# Patient Record
Sex: MALE | Race: WHITE | Employment: OTHER
[De-identification: names, ages, dates, MRNs, and addresses within clinical notes are randomized per-mention and may not be internally consistent; named-entity substitution may affect disease eponyms.]

---

## 2019-02-14 ENCOUNTER — TELEPHONE (OUTPATIENT)
Dept: CASE MANAGEMENT | Age: 64
End: 2019-02-14

## 2019-02-20 ENCOUNTER — TELEPHONE (OUTPATIENT)
Dept: ONCOLOGY | Age: 64
End: 2019-02-20

## 2019-02-21 ENCOUNTER — HOSPITAL ENCOUNTER (OUTPATIENT)
Age: 64
Discharge: HOME | End: 2019-02-21
Payer: MEDICARE

## 2019-02-21 ENCOUNTER — HOSPITAL ENCOUNTER (OUTPATIENT)
Dept: CT IMAGING | Age: 64
Discharge: HOME | End: 2019-02-23
Payer: MEDICARE

## 2019-02-21 DIAGNOSIS — F17.200 SMOKER: ICD-10-CM

## 2019-02-21 LAB
ALT SERPL-CCNC: 16 U/L (ref 5–41)
ANION GAP SERPL CALCULATED.3IONS-SCNC: 10 MMOL/L (ref 9–17)
BUN BLDV-MCNC: 13 MG/DL (ref 8–23)
BUN/CREAT BLD: NORMAL (ref 9–20)
CALCIUM SERPL-MCNC: 9.2 MG/DL (ref 8.6–10.4)
CHLORIDE BLD-SCNC: 105 MMOL/L (ref 98–107)
CHOLESTEROL/HDL RATIO: 3.4
CHOLESTEROL: 158 MG/DL
CO2: 23 MMOL/L (ref 20–31)
CREAT SERPL-MCNC: 0.92 MG/DL (ref 0.7–1.2)
GFR AFRICAN AMERICAN: >60 ML/MIN
GFR NON-AFRICAN AMERICAN: >60 ML/MIN
GFR SERPL CREATININE-BSD FRML MDRD: NORMAL ML/MIN/{1.73_M2}
GFR SERPL CREATININE-BSD FRML MDRD: NORMAL ML/MIN/{1.73_M2}
GLUCOSE BLD-MCNC: 98 MG/DL (ref 70–99)
HCT VFR BLD CALC: 46.5 % (ref 41–53)
HDLC SERPL-MCNC: 47 MG/DL
HEMOGLOBIN: 15.1 G/DL (ref 13.5–17.5)
LDL CHOLESTEROL: 85 MG/DL (ref 0–130)
MCH RBC QN AUTO: 27.5 PG (ref 26–34)
MCHC RBC AUTO-ENTMCNC: 32.4 G/DL (ref 31–37)
MCV RBC AUTO: 85.1 FL (ref 80–100)
NRBC AUTOMATED: NORMAL PER 100 WBC
PDW BLD-RTO: 13.4 % (ref 11.5–14.9)
PLATELET # BLD: 184 K/UL (ref 150–450)
PMV BLD AUTO: 8.7 FL (ref 6–12)
POTASSIUM SERPL-SCNC: 4.5 MMOL/L (ref 3.7–5.3)
PROSTATE SPECIFIC ANTIGEN: 1.77 UG/L
RBC # BLD: 5.47 M/UL (ref 4.5–5.9)
SODIUM BLD-SCNC: 138 MMOL/L (ref 135–144)
TRIGL SERPL-MCNC: 130 MG/DL
VLDLC SERPL CALC-MCNC: NORMAL MG/DL (ref 1–30)
WBC # BLD: 8.2 K/UL (ref 3.5–11)

## 2019-02-21 PROCEDURE — 36415 COLL VENOUS BLD VENIPUNCTURE: CPT

## 2019-02-21 PROCEDURE — 84460 ALANINE AMINO (ALT) (SGPT): CPT

## 2019-02-21 PROCEDURE — 80048 BASIC METABOLIC PNL TOTAL CA: CPT

## 2019-02-21 PROCEDURE — G0297 LDCT FOR LUNG CA SCREEN: HCPCS

## 2019-02-21 PROCEDURE — 85027 COMPLETE CBC AUTOMATED: CPT

## 2019-02-21 PROCEDURE — 80061 LIPID PANEL: CPT

## 2019-02-21 PROCEDURE — G0103 PSA SCREENING: HCPCS

## 2019-03-12 ENCOUNTER — TELEPHONE (OUTPATIENT)
Dept: ONCOLOGY | Age: 64
End: 2019-03-12

## 2019-03-12 VITALS — WEIGHT: 204 LBS | HEIGHT: 72 IN | BODY MASS INDEX: 27.63 KG/M2

## 2020-02-17 ENCOUNTER — TELEPHONE (OUTPATIENT)
Dept: ONCOLOGY | Age: 65
End: 2020-02-17

## 2020-02-17 NOTE — TELEPHONE ENCOUNTER
Outside order received for CT lung screening. EMR and order reviewed. Scheduling notified by faxed order, to contact patient and schedule. Information regarding ct lung screening and smoking cessation referral mailed to patient.

## 2020-02-24 ENCOUNTER — HOSPITAL ENCOUNTER (OUTPATIENT)
Dept: CT IMAGING | Age: 65
Discharge: HOME | End: 2020-02-26
Payer: MEDICARE

## 2020-02-24 PROCEDURE — G0297 LDCT FOR LUNG CA SCREEN: HCPCS

## 2021-02-03 ENCOUNTER — TELEPHONE (OUTPATIENT)
Dept: ONCOLOGY | Age: 66
End: 2021-02-03

## 2021-02-08 ENCOUNTER — TELEPHONE (OUTPATIENT)
Dept: ONCOLOGY | Age: 66
End: 2021-02-08

## 2021-02-08 NOTE — LETTER
2/8/2021        [address] [place] [address]    Dear Carrie Duenas:    Your healthcare provider has ordered a low dose CT scan of the chest for lung cancer screening. You will find enclosed, information about CT lung screening. Please review the statement of understanding, you will be asked to sign a copy of this at the time of your CT scan    If you have not already been contacted to make the appointment for your scan, please call our scheduling department at [phone]    Keep in mind that CT lung screening does not take the place of smoking cessation. If you are a current smoker, you will find enclosed smoking cessation resources. Please do not hesitate to contact me if you have any questions or concerns.     [address] [place] [address] Lung Screening Program  [phone]

## 2021-02-23 ENCOUNTER — HOSPITAL ENCOUNTER (OUTPATIENT)
Dept: CT IMAGING | Age: 66
Discharge: HOME | End: 2021-02-25
Payer: MEDICARE

## 2021-02-23 DIAGNOSIS — Z87.891 HISTORY OF TOBACCO USE: ICD-10-CM

## 2021-02-23 DIAGNOSIS — F17.211 CIGARETTE NICOTINE DEPENDENCE IN REMISSION: ICD-10-CM

## 2021-02-23 PROCEDURE — 71271 CT THORAX LUNG CANCER SCR C-: CPT

## 2021-03-04 ENCOUNTER — HOSPITAL ENCOUNTER (OUTPATIENT)
Dept: ULTRASOUND IMAGING | Age: 66
Discharge: HOME | End: 2021-03-06
Payer: MEDICARE

## 2021-03-04 DIAGNOSIS — N28.9 KIDNEY LESION: ICD-10-CM

## 2021-03-04 PROCEDURE — 76770 US EXAM ABDO BACK WALL COMP: CPT

## 2021-03-11 ENCOUNTER — HOSPITAL ENCOUNTER (OUTPATIENT)
Age: 66
Discharge: HOME | End: 2021-03-11
Payer: MEDICARE

## 2021-03-11 LAB
ALT SERPL-CCNC: 15 U/L (ref 5–41)
ANION GAP SERPL CALCULATED.3IONS-SCNC: 11 MMOL/L (ref 9–17)
AST SERPL-CCNC: 20 U/L
BUN BLDV-MCNC: 18 MG/DL (ref 8–23)
BUN/CREAT BLD: NORMAL (ref 9–20)
CALCIUM SERPL-MCNC: 9.6 MG/DL (ref 8.6–10.4)
CHLORIDE BLD-SCNC: 102 MMOL/L (ref 98–107)
CHOLESTEROL/HDL RATIO: 2.9
CHOLESTEROL: 112 MG/DL
CO2: 24 MMOL/L (ref 20–31)
CREAT SERPL-MCNC: 0.92 MG/DL (ref 0.7–1.2)
GFR AFRICAN AMERICAN: >60 ML/MIN
GFR NON-AFRICAN AMERICAN: >60 ML/MIN
GFR SERPL CREATININE-BSD FRML MDRD: NORMAL ML/MIN/{1.73_M2}
GFR SERPL CREATININE-BSD FRML MDRD: NORMAL ML/MIN/{1.73_M2}
GLUCOSE BLD-MCNC: 96 MG/DL (ref 70–99)
HDLC SERPL-MCNC: 38 MG/DL
LDL CHOLESTEROL: 64 MG/DL (ref 0–130)
POTASSIUM SERPL-SCNC: 4.8 MMOL/L (ref 3.7–5.3)
PROSTATE SPECIFIC ANTIGEN: 2.69 UG/L
SODIUM BLD-SCNC: 137 MMOL/L (ref 135–144)
TRIGL SERPL-MCNC: 52 MG/DL
VITAMIN D 25-HYDROXY: 19 NG/ML (ref 30–100)
VLDLC SERPL CALC-MCNC: ABNORMAL MG/DL (ref 1–30)

## 2021-03-11 PROCEDURE — 84450 TRANSFERASE (AST) (SGOT): CPT

## 2021-03-11 PROCEDURE — 82306 VITAMIN D 25 HYDROXY: CPT

## 2021-03-11 PROCEDURE — 80048 BASIC METABOLIC PNL TOTAL CA: CPT

## 2021-03-11 PROCEDURE — 80061 LIPID PANEL: CPT

## 2021-03-11 PROCEDURE — G0103 PSA SCREENING: HCPCS

## 2021-03-11 PROCEDURE — 36415 COLL VENOUS BLD VENIPUNCTURE: CPT

## 2021-03-11 PROCEDURE — 84460 ALANINE AMINO (ALT) (SGPT): CPT

## 2021-10-04 ENCOUNTER — HOSPITAL ENCOUNTER (OUTPATIENT)
Age: 66
Discharge: HOME | End: 2021-10-04
Payer: MEDICARE

## 2021-10-04 LAB — PROSTATE SPECIFIC ANTIGEN: 2.08 UG/L

## 2021-10-04 PROCEDURE — G0103 PSA SCREENING: HCPCS

## 2021-10-04 PROCEDURE — 36415 COLL VENOUS BLD VENIPUNCTURE: CPT

## 2022-03-01 ENCOUNTER — TELEPHONE (OUTPATIENT)
Dept: ONCOLOGY | Age: 67
End: 2022-03-01

## 2022-03-01 NOTE — TELEPHONE ENCOUNTER
Reviewed Auto-Printed CT Lung Screening Reminder Letter and patient's chart, patient is due and not yet scheduled. Mailed letter to patient.

## 2022-03-30 ENCOUNTER — HOSPITAL ENCOUNTER (OUTPATIENT)
Age: 67
Discharge: HOME | End: 2022-03-30
Payer: MEDICARE

## 2022-03-30 LAB
ALT SERPL-CCNC: 18 U/L (ref 5–41)
ANION GAP SERPL CALCULATED.3IONS-SCNC: 11 MMOL/L (ref 9–17)
BUN BLDV-MCNC: 10 MG/DL (ref 8–23)
CALCIUM SERPL-MCNC: 9.6 MG/DL (ref 8.6–10.4)
CHLORIDE BLD-SCNC: 106 MMOL/L (ref 98–107)
CHOLESTEROL/HDL RATIO: 3
CHOLESTEROL: 133 MG/DL
CO2: 22 MMOL/L (ref 20–31)
CREAT SERPL-MCNC: 0.83 MG/DL (ref 0.7–1.2)
GFR AFRICAN AMERICAN: >60 ML/MIN
GFR NON-AFRICAN AMERICAN: >60 ML/MIN
GFR SERPL CREATININE-BSD FRML MDRD: ABNORMAL ML/MIN/{1.73_M2}
GLUCOSE BLD-MCNC: 105 MG/DL (ref 70–99)
HDLC SERPL-MCNC: 44 MG/DL
LDL CHOLESTEROL: 74 MG/DL (ref 0–130)
POTASSIUM SERPL-SCNC: 4.5 MMOL/L (ref 3.7–5.3)
PROSTATE SPECIFIC ANTIGEN: 1.94 UG/L
SODIUM BLD-SCNC: 139 MMOL/L (ref 135–144)
TRIGL SERPL-MCNC: 73 MG/DL
VITAMIN D 25-HYDROXY: 27.2 NG/ML

## 2022-03-30 PROCEDURE — 84153 ASSAY OF PSA TOTAL: CPT

## 2022-03-30 PROCEDURE — 36415 COLL VENOUS BLD VENIPUNCTURE: CPT

## 2022-03-30 PROCEDURE — 80061 LIPID PANEL: CPT

## 2022-03-30 PROCEDURE — 84460 ALANINE AMINO (ALT) (SGPT): CPT

## 2022-03-30 PROCEDURE — 82306 VITAMIN D 25 HYDROXY: CPT

## 2022-03-30 PROCEDURE — 80048 BASIC METABOLIC PNL TOTAL CA: CPT

## 2022-04-04 ENCOUNTER — TELEPHONE (OUTPATIENT)
Dept: ONCOLOGY | Age: 67
End: 2022-04-04

## 2022-04-06 ENCOUNTER — HOSPITAL ENCOUNTER (OUTPATIENT)
Dept: CT IMAGING | Age: 67
Discharge: HOME | End: 2022-04-08
Payer: MEDICARE

## 2022-04-06 ENCOUNTER — HOSPITAL ENCOUNTER (OUTPATIENT)
Dept: ULTRASOUND IMAGING | Age: 67
Discharge: HOME | End: 2022-04-08
Payer: MEDICARE

## 2022-04-06 DIAGNOSIS — F17.211 CIGARETTE NICOTINE DEPENDENCE IN REMISSION: ICD-10-CM

## 2022-04-06 DIAGNOSIS — Z87.891 PERSONAL HISTORY OF TOBACCO USE: ICD-10-CM

## 2022-04-06 DIAGNOSIS — N28.1 RENAL CYST: ICD-10-CM

## 2022-04-06 PROCEDURE — 76770 US EXAM ABDO BACK WALL COMP: CPT

## 2022-04-06 PROCEDURE — 71271 CT THORAX LUNG CANCER SCR C-: CPT

## 2023-03-07 ENCOUNTER — TELEPHONE (OUTPATIENT)
Dept: ONCOLOGY | Age: 68
End: 2023-03-07

## 2023-04-23 ENCOUNTER — APPOINTMENT (OUTPATIENT)
Dept: GENERAL RADIOLOGY | Age: 68
DRG: 247 | End: 2023-04-23
Payer: MEDICARE

## 2023-04-23 ENCOUNTER — HOSPITAL ENCOUNTER (INPATIENT)
Age: 68
LOS: 2 days | Discharge: HOME | DRG: 247 | End: 2023-04-25
Attending: EMERGENCY MEDICINE
Payer: MEDICARE

## 2023-04-23 ENCOUNTER — HOSPITAL ENCOUNTER (EMERGENCY)
Age: 68
Discharge: ANOTHER HOSPITAL | DRG: 247 | End: 2023-04-23
Attending: EMERGENCY MEDICINE
Payer: MEDICARE

## 2023-04-23 VITALS
BODY MASS INDEX: 30.48 KG/M2 | HEIGHT: 72 IN | TEMPERATURE: 98.3 F | SYSTOLIC BLOOD PRESSURE: 101 MMHG | OXYGEN SATURATION: 99 % | HEART RATE: 73 BPM | DIASTOLIC BLOOD PRESSURE: 74 MMHG | RESPIRATION RATE: 18 BRPM | WEIGHT: 225 LBS

## 2023-04-23 DIAGNOSIS — I21.4 NSTEMI (NON-ST ELEVATED MYOCARDIAL INFARCTION) (HCC): Primary | ICD-10-CM

## 2023-04-23 PROBLEM — K21.9 CHRONIC GERD: Status: ACTIVE | Noted: 2023-04-23

## 2023-04-23 PROBLEM — E87.1 HYPONATREMIA: Status: ACTIVE | Noted: 2023-04-23

## 2023-04-23 PROBLEM — E78.5 DYSLIPIDEMIA: Status: ACTIVE | Noted: 2023-04-23

## 2023-04-23 LAB
ABSOLUTE EOS #: 0 K/UL (ref 0–0.4)
ABSOLUTE LYMPH #: 1.3 K/UL (ref 1–4.8)
ABSOLUTE MONO #: 1.5 K/UL (ref 0.1–1.3)
ALBUMIN SERPL-MCNC: 3.3 G/DL (ref 3.5–5.2)
ALBUMIN/GLOBULIN RATIO: 1 (ref 1–2.5)
ALP SERPL-CCNC: 64 U/L (ref 40–129)
ALT SERPL-CCNC: 36 U/L (ref 5–41)
ANION GAP SERPL CALCULATED.3IONS-SCNC: 12 MMOL/L (ref 9–17)
AST SERPL-CCNC: 95 U/L
BASOPHILS # BLD: 0 % (ref 0–2)
BASOPHILS ABSOLUTE: 0 K/UL (ref 0–0.2)
BILIRUB DIRECT SERPL-MCNC: 0.6 MG/DL
BILIRUB INDIRECT SERPL-MCNC: 0.8 MG/DL (ref 0–1)
BILIRUB SERPL-MCNC: 1.4 MG/DL (ref 0.3–1.2)
BUN SERPL-MCNC: 13 MG/DL (ref 8–23)
CALCIUM SERPL-MCNC: 9.3 MG/DL (ref 8.6–10.4)
CHLORIDE SERPL-SCNC: 98 MMOL/L (ref 98–107)
CO2 SERPL-SCNC: 22 MMOL/L (ref 20–31)
CREAT SERPL-MCNC: 0.92 MG/DL (ref 0.7–1.2)
EOSINOPHILS RELATIVE PERCENT: 0 % (ref 0–4)
GFR SERPL CREATININE-BSD FRML MDRD: >60 ML/MIN/1.73M2
GLUCOSE SERPL-MCNC: 108 MG/DL (ref 70–99)
HCT VFR BLD AUTO: 37.4 % (ref 40.7–50.3)
HCT VFR BLD AUTO: 39.3 % (ref 41–53)
HGB BLD-MCNC: 12.1 G/DL (ref 13–17)
HGB BLD-MCNC: 13.1 G/DL (ref 13.5–17.5)
LYMPHOCYTES # BLD: 12 % (ref 24–44)
MCH RBC QN AUTO: 27.9 PG (ref 26–34)
MCH RBC QN AUTO: 28.1 PG (ref 25.2–33.5)
MCHC RBC AUTO-ENTMCNC: 32.4 G/DL (ref 28.4–34.8)
MCHC RBC AUTO-ENTMCNC: 33.3 G/DL (ref 31–37)
MCV RBC AUTO: 83.9 FL (ref 80–100)
MCV RBC AUTO: 86.8 FL (ref 82.6–102.9)
MONOCYTES # BLD: 14 % (ref 1–7)
NRBC AUTOMATED: 0 PER 100 WBC
PARTIAL THROMBOPLASTIN TIME: 31.8 SEC (ref 24–36)
PARTIAL THROMBOPLASTIN TIME: 37.2 SEC (ref 23–36.5)
PARTIAL THROMBOPLASTIN TIME: 59 SEC (ref 23–36.5)
PDW BLD-RTO: 13.7 % (ref 11.8–14.4)
PDW BLD-RTO: 13.8 % (ref 11.5–14.9)
PLATELET # BLD AUTO: 172 K/UL (ref 150–450)
PLATELET # BLD AUTO: ABNORMAL K/UL (ref 138–453)
PLATELET, FLUORESCENCE: 144 K/UL (ref 138–453)
PLATELET, IMMATURE FRACTION: 4.6 % (ref 1.1–10.3)
PMV BLD AUTO: 8.4 FL (ref 6–12)
POTASSIUM SERPL-SCNC: 3.8 MMOL/L (ref 3.7–5.3)
PROT SERPL-MCNC: 6.7 G/DL (ref 6.4–8.3)
RBC # BLD: 4.31 M/UL (ref 4.21–5.77)
RBC # BLD: 4.68 M/UL (ref 4.5–5.9)
SEG NEUTROPHILS: 74 % (ref 36–66)
SEGMENTED NEUTROPHILS ABSOLUTE COUNT: 7.5 K/UL (ref 1.3–9.1)
SODIUM SERPL-SCNC: 132 MMOL/L (ref 135–144)
TROPONIN I SERPL DL<=0.01 NG/ML-MCNC: 2451 NG/L (ref 0–22)
TROPONIN I SERPL DL<=0.01 NG/ML-MCNC: 2488 NG/L (ref 0–22)
TROPONIN I SERPL DL<=0.01 NG/ML-MCNC: 2533 NG/L (ref 0–22)
TROPONIN I SERPL DL<=0.01 NG/ML-MCNC: 2544 NG/L (ref 0–22)
WBC # BLD AUTO: 10.3 K/UL (ref 3.5–11)
WBC # BLD AUTO: 11.4 K/UL (ref 3.5–11.3)

## 2023-04-23 PROCEDURE — 6370000000 HC RX 637 (ALT 250 FOR IP): Performed by: NURSE PRACTITIONER

## 2023-04-23 PROCEDURE — 71045 X-RAY EXAM CHEST 1 VIEW: CPT

## 2023-04-23 PROCEDURE — 83036 HEMOGLOBIN GLYCOSYLATED A1C: CPT

## 2023-04-23 PROCEDURE — 36415 COLL VENOUS BLD VENIPUNCTURE: CPT

## 2023-04-23 PROCEDURE — 80076 HEPATIC FUNCTION PANEL: CPT

## 2023-04-23 PROCEDURE — 96374 THER/PROPH/DIAG INJ IV PUSH: CPT

## 2023-04-23 PROCEDURE — 85027 COMPLETE CBC AUTOMATED: CPT

## 2023-04-23 PROCEDURE — 85730 THROMBOPLASTIN TIME PARTIAL: CPT

## 2023-04-23 PROCEDURE — 99223 1ST HOSP IP/OBS HIGH 75: CPT | Performed by: INTERNAL MEDICINE

## 2023-04-23 PROCEDURE — 6360000002 HC RX W HCPCS: Performed by: EMERGENCY MEDICINE

## 2023-04-23 PROCEDURE — 85025 COMPLETE CBC W/AUTO DIFF WBC: CPT

## 2023-04-23 PROCEDURE — 2500000003 HC RX 250 WO HCPCS: Performed by: STUDENT IN AN ORGANIZED HEALTH CARE EDUCATION/TRAINING PROGRAM

## 2023-04-23 PROCEDURE — 2580000003 HC RX 258: Performed by: NURSE PRACTITIONER

## 2023-04-23 PROCEDURE — 96375 TX/PRO/DX INJ NEW DRUG ADDON: CPT

## 2023-04-23 PROCEDURE — 6370000000 HC RX 637 (ALT 250 FOR IP): Performed by: EMERGENCY MEDICINE

## 2023-04-23 PROCEDURE — 84484 ASSAY OF TROPONIN QUANT: CPT

## 2023-04-23 PROCEDURE — 2060000000 HC ICU INTERMEDIATE R&B

## 2023-04-23 PROCEDURE — 93005 ELECTROCARDIOGRAM TRACING: CPT | Performed by: INTERNAL MEDICINE

## 2023-04-23 PROCEDURE — 93005 ELECTROCARDIOGRAM TRACING: CPT | Performed by: EMERGENCY MEDICINE

## 2023-04-23 PROCEDURE — 93005 ELECTROCARDIOGRAM TRACING: CPT | Performed by: STUDENT IN AN ORGANIZED HEALTH CARE EDUCATION/TRAINING PROGRAM

## 2023-04-23 PROCEDURE — 6360000002 HC RX W HCPCS: Performed by: INTERNAL MEDICINE

## 2023-04-23 PROCEDURE — 2580000003 HC RX 258: Performed by: STUDENT IN AN ORGANIZED HEALTH CARE EDUCATION/TRAINING PROGRAM

## 2023-04-23 PROCEDURE — 99285 EMERGENCY DEPT VISIT HI MDM: CPT

## 2023-04-23 PROCEDURE — 80048 BASIC METABOLIC PNL TOTAL CA: CPT

## 2023-04-23 PROCEDURE — 6360000002 HC RX W HCPCS: Performed by: STUDENT IN AN ORGANIZED HEALTH CARE EDUCATION/TRAINING PROGRAM

## 2023-04-23 PROCEDURE — 85055 RETICULATED PLATELET ASSAY: CPT

## 2023-04-23 RX ORDER — HEPARIN SODIUM 1000 [USP'U]/ML
60 INJECTION, SOLUTION INTRAVENOUS; SUBCUTANEOUS PRN
Status: DISCONTINUED | OUTPATIENT
Start: 2023-04-23 | End: 2023-04-23 | Stop reason: HOSPADM

## 2023-04-23 RX ORDER — METOPROLOL TARTRATE 50 MG/1
25 TABLET, FILM COATED ORAL 2 TIMES DAILY
Status: DISCONTINUED | OUTPATIENT
Start: 2023-04-23 | End: 2023-04-25 | Stop reason: HOSPADM

## 2023-04-23 RX ORDER — POTASSIUM CHLORIDE 20 MEQ/1
40 TABLET, EXTENDED RELEASE ORAL PRN
Status: DISCONTINUED | OUTPATIENT
Start: 2023-04-23 | End: 2023-04-25 | Stop reason: HOSPADM

## 2023-04-23 RX ORDER — MORPHINE SULFATE 4 MG/ML
4 INJECTION, SOLUTION INTRAMUSCULAR; INTRAVENOUS ONCE
Status: COMPLETED | OUTPATIENT
Start: 2023-04-23 | End: 2023-04-23

## 2023-04-23 RX ORDER — SODIUM CHLORIDE 9 MG/ML
INJECTION, SOLUTION INTRAVENOUS PRN
Status: DISCONTINUED | OUTPATIENT
Start: 2023-04-23 | End: 2023-04-25 | Stop reason: HOSPADM

## 2023-04-23 RX ORDER — OMEPRAZOLE 20 MG/1
20 CAPSULE, DELAYED RELEASE ORAL DAILY
COMMUNITY

## 2023-04-23 RX ORDER — HEPARIN SODIUM 1000 [USP'U]/ML
2000 INJECTION, SOLUTION INTRAVENOUS; SUBCUTANEOUS PRN
Status: DISCONTINUED | OUTPATIENT
Start: 2023-04-23 | End: 2023-04-25

## 2023-04-23 RX ORDER — ASPIRIN 81 MG/1
324 TABLET, CHEWABLE ORAL ONCE
Status: COMPLETED | OUTPATIENT
Start: 2023-04-23 | End: 2023-04-23

## 2023-04-23 RX ORDER — NITROGLYCERIN 20 MG/100ML
5-200 INJECTION INTRAVENOUS CONTINUOUS
Status: DISCONTINUED | OUTPATIENT
Start: 2023-04-23 | End: 2023-04-25

## 2023-04-23 RX ORDER — 0.9 % SODIUM CHLORIDE 0.9 %
1000 INTRAVENOUS SOLUTION INTRAVENOUS ONCE
Status: COMPLETED | OUTPATIENT
Start: 2023-04-23 | End: 2023-04-23

## 2023-04-23 RX ORDER — HEPARIN SODIUM 1000 [USP'U]/ML
30 INJECTION, SOLUTION INTRAVENOUS; SUBCUTANEOUS PRN
Status: DISCONTINUED | OUTPATIENT
Start: 2023-04-23 | End: 2023-04-23 | Stop reason: HOSPADM

## 2023-04-23 RX ORDER — MORPHINE SULFATE 2 MG/ML
2 INJECTION, SOLUTION INTRAMUSCULAR; INTRAVENOUS
Status: DISCONTINUED | OUTPATIENT
Start: 2023-04-23 | End: 2023-04-25 | Stop reason: HOSPADM

## 2023-04-23 RX ORDER — ASPIRIN 81 MG/1
81 TABLET, CHEWABLE ORAL DAILY
Status: DISCONTINUED | OUTPATIENT
Start: 2023-04-24 | End: 2023-04-25 | Stop reason: HOSPADM

## 2023-04-23 RX ORDER — ONDANSETRON 4 MG/1
4 TABLET, ORALLY DISINTEGRATING ORAL EVERY 8 HOURS PRN
Status: DISCONTINUED | OUTPATIENT
Start: 2023-04-23 | End: 2023-04-25 | Stop reason: HOSPADM

## 2023-04-23 RX ORDER — HEPARIN SODIUM 1000 [USP'U]/ML
60 INJECTION, SOLUTION INTRAVENOUS; SUBCUTANEOUS ONCE
Status: DISCONTINUED | OUTPATIENT
Start: 2023-04-23 | End: 2023-04-23 | Stop reason: SDUPTHER

## 2023-04-23 RX ORDER — HEPARIN SODIUM 10000 [USP'U]/100ML
5-30 INJECTION, SOLUTION INTRAVENOUS CONTINUOUS
Status: DISCONTINUED | OUTPATIENT
Start: 2023-04-23 | End: 2023-04-25

## 2023-04-23 RX ORDER — PANTOPRAZOLE SODIUM 40 MG/1
40 TABLET, DELAYED RELEASE ORAL
Status: DISCONTINUED | OUTPATIENT
Start: 2023-04-24 | End: 2023-04-25 | Stop reason: HOSPADM

## 2023-04-23 RX ORDER — OMEGA-3S/DHA/EPA/FISH OIL/D3 300MG-1000
400 CAPSULE ORAL DAILY
Status: DISCONTINUED | OUTPATIENT
Start: 2023-04-24 | End: 2023-04-25 | Stop reason: HOSPADM

## 2023-04-23 RX ORDER — POTASSIUM CHLORIDE 7.45 MG/ML
10 INJECTION INTRAVENOUS PRN
Status: DISCONTINUED | OUTPATIENT
Start: 2023-04-23 | End: 2023-04-25 | Stop reason: HOSPADM

## 2023-04-23 RX ORDER — HEPARIN SODIUM 1000 [USP'U]/ML
4000 INJECTION, SOLUTION INTRAVENOUS; SUBCUTANEOUS PRN
Status: DISCONTINUED | OUTPATIENT
Start: 2023-04-23 | End: 2023-04-25

## 2023-04-23 RX ORDER — NITROGLYCERIN 20 MG/100ML
5-200 INJECTION INTRAVENOUS CONTINUOUS
Status: DISCONTINUED | OUTPATIENT
Start: 2023-04-23 | End: 2023-04-23 | Stop reason: HOSPADM

## 2023-04-23 RX ORDER — MAGNESIUM SULFATE 1 G/100ML
1000 INJECTION INTRAVENOUS PRN
Status: DISCONTINUED | OUTPATIENT
Start: 2023-04-23 | End: 2023-04-25 | Stop reason: HOSPADM

## 2023-04-23 RX ORDER — NITROGLYCERIN 0.4 MG/1
0.4 TABLET SUBLINGUAL EVERY 5 MIN PRN
Status: DISCONTINUED | OUTPATIENT
Start: 2023-04-23 | End: 2023-04-25 | Stop reason: HOSPADM

## 2023-04-23 RX ORDER — PRAVASTATIN SODIUM 40 MG
40 TABLET ORAL 2 TIMES DAILY
COMMUNITY

## 2023-04-23 RX ORDER — HEPARIN SODIUM 10000 [USP'U]/100ML
5-30 INJECTION, SOLUTION INTRAVENOUS CONTINUOUS
Status: DISCONTINUED | OUTPATIENT
Start: 2023-04-23 | End: 2023-04-24 | Stop reason: SDUPTHER

## 2023-04-23 RX ORDER — PRAVASTATIN SODIUM 20 MG
40 TABLET ORAL 2 TIMES DAILY
Status: DISCONTINUED | OUTPATIENT
Start: 2023-04-23 | End: 2023-04-25 | Stop reason: HOSPADM

## 2023-04-23 RX ORDER — HEPARIN SODIUM 1000 [USP'U]/ML
60 INJECTION, SOLUTION INTRAVENOUS; SUBCUTANEOUS ONCE
Status: DISCONTINUED | OUTPATIENT
Start: 2023-04-23 | End: 2023-04-23

## 2023-04-23 RX ORDER — ACETAMINOPHEN 325 MG/1
650 TABLET ORAL EVERY 6 HOURS PRN
Status: DISCONTINUED | OUTPATIENT
Start: 2023-04-23 | End: 2023-04-25 | Stop reason: HOSPADM

## 2023-04-23 RX ORDER — HEPARIN SODIUM 1000 [USP'U]/ML
5000 INJECTION, SOLUTION INTRAVENOUS; SUBCUTANEOUS ONCE
Status: COMPLETED | OUTPATIENT
Start: 2023-04-23 | End: 2023-04-23

## 2023-04-23 RX ORDER — HEPARIN SODIUM 1000 [USP'U]/ML
5000 INJECTION, SOLUTION INTRAVENOUS; SUBCUTANEOUS PRN
Status: DISCONTINUED | OUTPATIENT
Start: 2023-04-23 | End: 2023-04-23

## 2023-04-23 RX ORDER — ATORVASTATIN CALCIUM 80 MG/1
80 TABLET, FILM COATED ORAL NIGHTLY
Status: DISCONTINUED | OUTPATIENT
Start: 2023-04-23 | End: 2023-04-23

## 2023-04-23 RX ORDER — OMEGA-3S/DHA/EPA/FISH OIL/D3 300MG-1000
400 CAPSULE ORAL DAILY
COMMUNITY

## 2023-04-23 RX ORDER — SODIUM CHLORIDE 0.9 % (FLUSH) 0.9 %
5-40 SYRINGE (ML) INJECTION EVERY 12 HOURS SCHEDULED
Status: DISCONTINUED | OUTPATIENT
Start: 2023-04-23 | End: 2023-04-25 | Stop reason: HOSPADM

## 2023-04-23 RX ORDER — ONDANSETRON 2 MG/ML
4 INJECTION INTRAMUSCULAR; INTRAVENOUS EVERY 6 HOURS PRN
Status: DISCONTINUED | OUTPATIENT
Start: 2023-04-23 | End: 2023-04-25 | Stop reason: HOSPADM

## 2023-04-23 RX ORDER — ACETAMINOPHEN 650 MG/1
650 SUPPOSITORY RECTAL EVERY 6 HOURS PRN
Status: DISCONTINUED | OUTPATIENT
Start: 2023-04-23 | End: 2023-04-25 | Stop reason: HOSPADM

## 2023-04-23 RX ORDER — MORPHINE SULFATE 4 MG/ML
4 INJECTION, SOLUTION INTRAMUSCULAR; INTRAVENOUS
Status: DISCONTINUED | OUTPATIENT
Start: 2023-04-23 | End: 2023-04-25 | Stop reason: HOSPADM

## 2023-04-23 RX ORDER — SODIUM CHLORIDE 0.9 % (FLUSH) 0.9 %
10 SYRINGE (ML) INJECTION PRN
Status: DISCONTINUED | OUTPATIENT
Start: 2023-04-23 | End: 2023-04-25 | Stop reason: HOSPADM

## 2023-04-23 RX ORDER — SODIUM CHLORIDE 9 MG/ML
INJECTION, SOLUTION INTRAVENOUS CONTINUOUS
Status: DISCONTINUED | OUTPATIENT
Start: 2023-04-23 | End: 2023-04-25

## 2023-04-23 RX ORDER — HEPARIN SODIUM 10000 [USP'U]/100ML
5-30 INJECTION, SOLUTION INTRAVENOUS CONTINUOUS
Status: DISCONTINUED | OUTPATIENT
Start: 2023-04-23 | End: 2023-04-23 | Stop reason: HOSPADM

## 2023-04-23 RX ORDER — HEPARIN SODIUM 1000 [USP'U]/ML
60 INJECTION, SOLUTION INTRAVENOUS; SUBCUTANEOUS PRN
Status: DISCONTINUED | OUTPATIENT
Start: 2023-04-23 | End: 2023-04-23

## 2023-04-23 RX ADMIN — HEPARIN SODIUM 9 UNITS/KG/HR: 10000 INJECTION, SOLUTION INTRAVENOUS at 17:08

## 2023-04-23 RX ADMIN — METOPROLOL TARTRATE 25 MG: 50 TABLET, FILM COATED ORAL at 22:24

## 2023-04-23 RX ADMIN — ASPIRIN 324 MG: 81 TABLET, CHEWABLE ORAL at 16:42

## 2023-04-23 RX ADMIN — SODIUM CHLORIDE 1000 ML: 9 INJECTION, SOLUTION INTRAVENOUS at 16:12

## 2023-04-23 RX ADMIN — NITROGLYCERIN 25 MCG/MIN: 20 INJECTION INTRAVENOUS at 19:26

## 2023-04-23 RX ADMIN — SODIUM CHLORIDE, PRESERVATIVE FREE 10 ML: 5 INJECTION INTRAVENOUS at 22:26

## 2023-04-23 RX ADMIN — HEPARIN SODIUM 9 UNITS/KG/HR: 10000 INJECTION, SOLUTION INTRAVENOUS at 19:24

## 2023-04-23 RX ADMIN — SODIUM CHLORIDE: 9 INJECTION, SOLUTION INTRAVENOUS at 22:21

## 2023-04-23 RX ADMIN — HEPARIN SODIUM 5000 UNITS: 1000 INJECTION INTRAVENOUS; SUBCUTANEOUS at 17:01

## 2023-04-23 RX ADMIN — MORPHINE SULFATE 2 MG: 2 INJECTION, SOLUTION INTRAMUSCULAR; INTRAVENOUS at 23:46

## 2023-04-23 RX ADMIN — MORPHINE SULFATE 4 MG: 4 INJECTION, SOLUTION INTRAMUSCULAR; INTRAVENOUS at 16:43

## 2023-04-23 RX ADMIN — SODIUM CHLORIDE 1000 ML: 9 INJECTION, SOLUTION INTRAVENOUS at 17:51

## 2023-04-23 RX ADMIN — NITROGLYCERIN 5 MCG/MIN: 20 INJECTION INTRAVENOUS at 17:53

## 2023-04-23 RX ADMIN — PRAVASTATIN SODIUM 40 MG: 20 TABLET ORAL at 22:26

## 2023-04-23 ASSESSMENT — ENCOUNTER SYMPTOMS
NAUSEA: 1
FACIAL SWELLING: 0
ABDOMINAL PAIN: 0
BACK PAIN: 0
SHORTNESS OF BREATH: 0
TROUBLE SWALLOWING: 0
CHEST TIGHTNESS: 1
VOMITING: 0
ABDOMINAL DISTENTION: 0

## 2023-04-23 ASSESSMENT — PAIN DESCRIPTION - PAIN TYPE
TYPE: ACUTE PAIN

## 2023-04-23 ASSESSMENT — PAIN DESCRIPTION - ORIENTATION
ORIENTATION: MID

## 2023-04-23 ASSESSMENT — PAIN DESCRIPTION - DESCRIPTORS
DESCRIPTORS: PRESSURE
DESCRIPTORS: PRESSURE
DESCRIPTORS: HEAVINESS;PRESSURE
DESCRIPTORS: PRESSURE
DESCRIPTORS: PRESSURE
DESCRIPTORS: PRESSURE;HEAVINESS

## 2023-04-23 ASSESSMENT — PAIN - FUNCTIONAL ASSESSMENT
PAIN_FUNCTIONAL_ASSESSMENT: ACTIVITIES ARE NOT PREVENTED
PAIN_FUNCTIONAL_ASSESSMENT: 0-10
PAIN_FUNCTIONAL_ASSESSMENT: ACTIVITIES ARE NOT PREVENTED
PAIN_FUNCTIONAL_ASSESSMENT: ACTIVITIES ARE NOT PREVENTED

## 2023-04-23 ASSESSMENT — PAIN SCALES - GENERAL
PAINLEVEL_OUTOF10: 2
PAINLEVEL_OUTOF10: 5
PAINLEVEL_OUTOF10: 2
PAINLEVEL_OUTOF10: 5
PAINLEVEL_OUTOF10: 3
PAINLEVEL_OUTOF10: 4
PAINLEVEL_OUTOF10: 2

## 2023-04-23 ASSESSMENT — PAIN DESCRIPTION - LOCATION
LOCATION: CHEST

## 2023-04-23 ASSESSMENT — PAIN DESCRIPTION - FREQUENCY
FREQUENCY: CONTINUOUS
FREQUENCY: CONTINUOUS

## 2023-04-24 ENCOUNTER — APPOINTMENT (OUTPATIENT)
Dept: CARDIAC CATH/INVASIVE PROCEDURES | Age: 68
DRG: 247 | End: 2023-04-24
Payer: MEDICARE

## 2023-04-24 PROBLEM — N30.00 ACUTE CYSTITIS: Status: ACTIVE | Noted: 2023-04-24

## 2023-04-24 LAB
ACTIVATED CLOTTING TIME: 403 SEC (ref 79–149)
BACTERIA: ABNORMAL
BILIRUBIN URINE: ABNORMAL
BNP SERPL-MCNC: 4413 PG/ML
CHOLEST SERPL-MCNC: 98 MG/DL
CHOLESTEROL/HDL RATIO: 2.1
COLOR: ABNORMAL
EKG ATRIAL RATE: 288 BPM
EKG ATRIAL RATE: 77 BPM
EKG ATRIAL RATE: 85 BPM
EKG P AXIS: 36 DEGREES
EKG P AXIS: 60 DEGREES
EKG P-R INTERVAL: 124 MS
EKG P-R INTERVAL: 160 MS
EKG Q-T INTERVAL: 374 MS
EKG Q-T INTERVAL: 384 MS
EKG Q-T INTERVAL: 394 MS
EKG QRS DURATION: 116 MS
EKG QRS DURATION: 88 MS
EKG QRS DURATION: 96 MS
EKG QTC CALCULATION (BAZETT): 415 MS
EKG QTC CALCULATION (BAZETT): 434 MS
EKG QTC CALCULATION (BAZETT): 468 MS
EKG R AXIS: -26 DEGREES
EKG R AXIS: -34 DEGREES
EKG R AXIS: -40 DEGREES
EKG T AXIS: -31 DEGREES
EKG T AXIS: -33 DEGREES
EKG T AXIS: -36 DEGREES
EKG VENTRICULAR RATE: 74 BPM
EKG VENTRICULAR RATE: 77 BPM
EKG VENTRICULAR RATE: 85 BPM
EPITHELIAL CELLS UA: ABNORMAL /HPF (ref 0–5)
GLUCOSE UR STRIP.AUTO-MCNC: NEGATIVE MG/DL
HCT VFR BLD AUTO: 36.9 % (ref 40.7–50.3)
HDLC SERPL-MCNC: 46 MG/DL
HGB BLD-MCNC: 11.3 G/DL (ref 13–17)
KETONES UR STRIP.AUTO-MCNC: NEGATIVE MG/DL
LDLC SERPL CALC-MCNC: 42 MG/DL (ref 0–130)
LEUKOCYTE ESTERASE UR QL STRIP.AUTO: ABNORMAL
MAGNESIUM SERPL-MCNC: 2 MG/DL (ref 1.6–2.6)
MCH RBC QN AUTO: 27.7 PG (ref 25.2–33.5)
MCHC RBC AUTO-ENTMCNC: 30.6 G/DL (ref 28.4–34.8)
MCV RBC AUTO: 90.4 FL (ref 82.6–102.9)
MUCUS: ABNORMAL
NITRITE UR QL STRIP.AUTO: POSITIVE
NRBC AUTOMATED: 0 PER 100 WBC
PARTIAL THROMBOPLASTIN TIME: 53.1 SEC (ref 23–36.5)
PDW BLD-RTO: 13.8 % (ref 11.8–14.4)
PLATELET # BLD AUTO: 136 K/UL (ref 138–453)
PMV BLD AUTO: 11.9 FL (ref 8.1–13.5)
PROT UR STRIP.AUTO-MCNC: 5.5 MG/DL (ref 5–8)
PROT UR STRIP.AUTO-MCNC: ABNORMAL MG/DL
RBC # BLD: 4.08 M/UL (ref 4.21–5.77)
RBC CLUMPS #/AREA URNS AUTO: ABNORMAL /HPF (ref 0–2)
SPECIFIC GRAVITY UA: 1.02 (ref 1–1.03)
TRIGL SERPL-MCNC: 51 MG/DL
TROPONIN I SERPL DL<=0.01 NG/ML-MCNC: 2626 NG/L (ref 0–22)
TSH SERPL-ACNC: 2.35 UIU/ML (ref 0.3–5)
TURBIDITY: ABNORMAL
URINE HGB: NEGATIVE
UROBILINOGEN, URINE: ABNORMAL
WBC # BLD AUTO: 9.6 K/UL (ref 3.5–11.3)
WBC UA: ABNORMAL /HPF (ref 0–5)

## 2023-04-24 PROCEDURE — C1887 CATHETER, GUIDING: HCPCS

## 2023-04-24 PROCEDURE — 2060000000 HC ICU INTERMEDIATE R&B

## 2023-04-24 PROCEDURE — 6360000002 HC RX W HCPCS: Performed by: STUDENT IN AN ORGANIZED HEALTH CARE EDUCATION/TRAINING PROGRAM

## 2023-04-24 PROCEDURE — 6360000004 HC RX CONTRAST MEDICATION

## 2023-04-24 PROCEDURE — 4A023N7 MEASUREMENT OF CARDIAC SAMPLING AND PRESSURE, LEFT HEART, PERCUTANEOUS APPROACH: ICD-10-PCS | Performed by: INTERNAL MEDICINE

## 2023-04-24 PROCEDURE — 027034Z DILATION OF CORONARY ARTERY, ONE ARTERY WITH DRUG-ELUTING INTRALUMINAL DEVICE, PERCUTANEOUS APPROACH: ICD-10-PCS | Performed by: INTERNAL MEDICINE

## 2023-04-24 PROCEDURE — C1725 CATH, TRANSLUMIN NON-LASER: HCPCS

## 2023-04-24 PROCEDURE — 85347 COAGULATION TIME ACTIVATED: CPT

## 2023-04-24 PROCEDURE — C1874 STENT, COATED/COV W/DEL SYS: HCPCS

## 2023-04-24 PROCEDURE — 2720000010 HC SURG SUPPLY STERILE

## 2023-04-24 PROCEDURE — 93010 ELECTROCARDIOGRAM REPORT: CPT | Performed by: INTERNAL MEDICINE

## 2023-04-24 PROCEDURE — 6360000002 HC RX W HCPCS: Performed by: NURSE PRACTITIONER

## 2023-04-24 PROCEDURE — 94761 N-INVAS EAR/PLS OXIMETRY MLT: CPT

## 2023-04-24 PROCEDURE — 2500000003 HC RX 250 WO HCPCS: Performed by: NURSE PRACTITIONER

## 2023-04-24 PROCEDURE — 99233 SBSQ HOSP IP/OBS HIGH 50: CPT | Performed by: FAMILY MEDICINE

## 2023-04-24 PROCEDURE — 2709999900 HC NON-CHARGEABLE SUPPLY

## 2023-04-24 PROCEDURE — 2500000003 HC RX 250 WO HCPCS

## 2023-04-24 PROCEDURE — C1769 GUIDE WIRE: HCPCS

## 2023-04-24 PROCEDURE — 85730 THROMBOPLASTIN TIME PARTIAL: CPT

## 2023-04-24 PROCEDURE — 80061 LIPID PANEL: CPT

## 2023-04-24 PROCEDURE — 83880 ASSAY OF NATRIURETIC PEPTIDE: CPT

## 2023-04-24 PROCEDURE — C1757 CATH, THROMBECTOMY/EMBOLECT: HCPCS

## 2023-04-24 PROCEDURE — 87086 URINE CULTURE/COLONY COUNT: CPT

## 2023-04-24 PROCEDURE — C9600 PERC DRUG-EL COR STENT SING: HCPCS

## 2023-04-24 PROCEDURE — 02C03ZZ EXTIRPATION OF MATTER FROM CORONARY ARTERY, ONE ARTERY, PERCUTANEOUS APPROACH: ICD-10-PCS | Performed by: INTERNAL MEDICINE

## 2023-04-24 PROCEDURE — B2111ZZ FLUOROSCOPY OF MULTIPLE CORONARY ARTERIES USING LOW OSMOLAR CONTRAST: ICD-10-PCS | Performed by: INTERNAL MEDICINE

## 2023-04-24 PROCEDURE — 6370000000 HC RX 637 (ALT 250 FOR IP)

## 2023-04-24 PROCEDURE — 6370000000 HC RX 637 (ALT 250 FOR IP): Performed by: NURSE PRACTITIONER

## 2023-04-24 PROCEDURE — 6370000000 HC RX 637 (ALT 250 FOR IP): Performed by: STUDENT IN AN ORGANIZED HEALTH CARE EDUCATION/TRAINING PROGRAM

## 2023-04-24 PROCEDURE — 81001 URINALYSIS AUTO W/SCOPE: CPT

## 2023-04-24 PROCEDURE — 83735 ASSAY OF MAGNESIUM: CPT

## 2023-04-24 PROCEDURE — 92973 PRQ TRLUML C MCHN ASP THRMBC: CPT

## 2023-04-24 PROCEDURE — C1894 INTRO/SHEATH, NON-LASER: HCPCS

## 2023-04-24 PROCEDURE — 84443 ASSAY THYROID STIM HORMONE: CPT

## 2023-04-24 PROCEDURE — 6370000000 HC RX 637 (ALT 250 FOR IP): Performed by: INTERNAL MEDICINE

## 2023-04-24 PROCEDURE — 6360000002 HC RX W HCPCS

## 2023-04-24 PROCEDURE — 36415 COLL VENOUS BLD VENIPUNCTURE: CPT

## 2023-04-24 PROCEDURE — 6370000000 HC RX 637 (ALT 250 FOR IP): Performed by: FAMILY MEDICINE

## 2023-04-24 PROCEDURE — 85027 COMPLETE CBC AUTOMATED: CPT

## 2023-04-24 PROCEDURE — 93458 L HRT ARTERY/VENTRICLE ANGIO: CPT

## 2023-04-24 PROCEDURE — 2580000003 HC RX 258: Performed by: NURSE PRACTITIONER

## 2023-04-24 RX ORDER — CIPROFLOXACIN 500 MG/1
500 TABLET, FILM COATED ORAL EVERY 12 HOURS SCHEDULED
Status: DISCONTINUED | OUTPATIENT
Start: 2023-04-24 | End: 2023-04-25 | Stop reason: HOSPADM

## 2023-04-24 RX ORDER — SODIUM CHLORIDE 9 MG/ML
INJECTION, SOLUTION INTRAVENOUS PRN
OUTPATIENT
Start: 2023-04-24

## 2023-04-24 RX ORDER — ACETAMINOPHEN 325 MG/1
650 TABLET ORAL EVERY 4 HOURS PRN
OUTPATIENT
Start: 2023-04-24

## 2023-04-24 RX ORDER — ATORVASTATIN CALCIUM 80 MG/1
80 TABLET, FILM COATED ORAL NIGHTLY
Status: DISCONTINUED | OUTPATIENT
Start: 2023-04-24 | End: 2023-04-25 | Stop reason: HOSPADM

## 2023-04-24 RX ORDER — SODIUM CHLORIDE 0.9 % (FLUSH) 0.9 %
5-40 SYRINGE (ML) INJECTION PRN
OUTPATIENT
Start: 2023-04-24

## 2023-04-24 RX ORDER — EPTIFIBATIDE 0.75 MG/ML
2 INJECTION, SOLUTION INTRAVENOUS CONTINUOUS
Status: DISPENSED | OUTPATIENT
Start: 2023-04-24 | End: 2023-04-25

## 2023-04-24 RX ORDER — SODIUM CHLORIDE 0.9 % (FLUSH) 0.9 %
5-40 SYRINGE (ML) INJECTION EVERY 12 HOURS SCHEDULED
OUTPATIENT
Start: 2023-04-24

## 2023-04-24 RX ADMIN — METOPROLOL TARTRATE 25 MG: 50 TABLET, FILM COATED ORAL at 07:56

## 2023-04-24 RX ADMIN — HEPARIN SODIUM 11 UNITS/KG/HR: 10000 INJECTION, SOLUTION INTRAVENOUS at 00:17

## 2023-04-24 RX ADMIN — PANTOPRAZOLE SODIUM 40 MG: 40 TABLET, DELAYED RELEASE ORAL at 07:56

## 2023-04-24 RX ADMIN — ASPIRIN 81 MG 81 MG: 81 TABLET ORAL at 07:56

## 2023-04-24 RX ADMIN — EPTIFIBATIDE 2 MCG/KG/MIN: 0.75 INJECTION INTRAVENOUS at 19:50

## 2023-04-24 RX ADMIN — METOPROLOL TARTRATE 25 MG: 50 TABLET, FILM COATED ORAL at 21:18

## 2023-04-24 RX ADMIN — TICAGRELOR 90 MG: 90 TABLET ORAL at 21:17

## 2023-04-24 RX ADMIN — SODIUM CHLORIDE: 9 INJECTION, SOLUTION INTRAVENOUS at 12:30

## 2023-04-24 RX ADMIN — EPTIFIBATIDE 2 MCG/KG/MIN: 0.75 INJECTION INTRAVENOUS at 14:56

## 2023-04-24 RX ADMIN — ATORVASTATIN CALCIUM 80 MG: 80 TABLET, FILM COATED ORAL at 21:17

## 2023-04-24 RX ADMIN — CIPROFLOXACIN 500 MG: 500 TABLET, FILM COATED ORAL at 21:17

## 2023-04-24 RX ADMIN — HEPARIN SODIUM 2000 UNITS: 1000 INJECTION INTRAVENOUS; SUBCUTANEOUS at 00:12

## 2023-04-24 RX ADMIN — SODIUM CHLORIDE, PRESERVATIVE FREE 10 ML: 5 INJECTION INTRAVENOUS at 21:19

## 2023-04-24 RX ADMIN — CHOLECALCIFEROL TAB 10 MCG (400 UNIT) 400 UNITS: 10 TAB at 07:56

## 2023-04-24 ASSESSMENT — ENCOUNTER SYMPTOMS
WHEEZING: 0
CONSTIPATION: 0
BLOOD IN STOOL: 0
SHORTNESS OF BREATH: 1
CHEST TIGHTNESS: 0
SHORTNESS OF BREATH: 0
COUGH: 0
ABDOMINAL PAIN: 0
RHINORRHEA: 0
NAUSEA: 0
NAUSEA: 1
VOMITING: 0
DIARRHEA: 0

## 2023-04-24 NOTE — PROGRESS NOTES
Patient admitted from ER to room 2009 with ongoing Nitroglycerin running at 25mcg/min and Heparin infusion running at 9units/kg/hr. Patient is having continuous mid sternal chest pain with pain score 2/10, pressure as describe by the patient. Telemetry applied and connected to monitor. Assessment completed and vitals signs obtained. Plan of care reviewed with patient. Will continue to monitor.

## 2023-04-24 NOTE — ED PROVIDER NOTES
[facility] ED  Emergency Department Encounter  Emergency Medicine Resident     Pt Name: Gee Bob  MRN: [id number]  [place] 1955  Date of evaluation: 4/24/23  PCP:  Danita Rand MD    CHIEF COMPLAINT       Chief Complaint   Patient presents with    Chest Pain       HISTORY OFPRESENT ILLNESS  (Location/Symptom, Timing/Onset, Context/Setting, Quality, Duration, Modifying Factors,Severity.)      Gee Bob is a 69 y.o. male with past medical history of hyperlipidemia who presents as a transfer from [facility] for NSTEMI. Patient reports that he has had 2 days of mild chest pressure, nausea and just generally not feeling well. Patient then started chopping down trees today. He then had significant worsening of his chest pain described as a pressure and an unrelenting weight on his chest.  Pain was worse with activity and taking a deep breath. Does report associated shortness of breath and nausea but denies diaphoresis, cough or vomiting. Denies previous cardiac history and does not take any cardiac medications. Denies recent stress test or echocardiogram.  He is a former smoker. Patient's work-up at [facility] showed a significantly elevated troponin of greater than 2500. He was given aspirin and started on heparin as well as nitroglycerin infusion. Patient also received morphine. He was discussed with cardiology and transferred here. PAST MEDICAL / SURGICAL / SOCIAL / FAMILY HISTORY     Past medical history of colon polyp, hyperlipidemia    Past surgical history colonoscopy    Social:  reports that he quit smoking about 4 years ago. His smoking use included cigarettes. He has a 40.00 pack-year smoking history. He has never used smokeless tobacco.    Family Hx: History reviewed. No pertinent family history.      Allergies:  Bee pollen and Pcn [penicillins]    Home Medications:  Prior to Admission medications    Medication Sig Start Date End Date

## 2023-04-24 NOTE — CONSULTS
bleeding, blood clots or swollen lymph nodes. Allergic/Immunologic: No nasal congestion or hives. PHYSICAL EXAM:      /72   Pulse 84   Temp 97.5 °F (36.4 °C) (Oral)   Resp 29   Ht 6' (1.829 m)   Wt 225 lb (102.1 kg)   SpO2 96%   BMI 30.52 kg/m²    Constitutional and General Appearance: alert, cooperative, no distress and appears stated age  HEENT: PERRL, no cervical lymphadenopathy. No masses palpable. Normal oral mucosa  Respiratory:  Normal excursion and expansion without use of accessory muscles  Resp Auscultation: Good respiratory effort. No for increased work of breathing. On auscultation: clear to auscultation bilaterally  Cardiovascular: The apical impulse is not displaced  Heart tones are crisp and normal. regular S1 and S2.  Jugular venous pulsation Normal  The carotid upstroke is normal in amplitude and contour without delay or bruit  Peripheral pulses are symmetrical and full   Abdomen:   No masses or tenderness  Bowel sounds present  Extremities:   No Cyanosis or Clubbing   Lower extremity edema: No   Skin: Warm and dry  Neurological:  Alert and oriented. Moves all extremities well  No abnormalities of mood, affect, memory, mentation, or behavior are noted    DATA:    Diagnostics:    EKG: NSR, T wave inversion in inferior leads  ECHO: ordered, but not yet obtained. Stress Test: not obtained.   Cardiac Angiography: 4/24/23      Diagnostic procedure: Lt Heart, Coronary Angio, LVgram      PCI procedure: PTCA / Drug Eluting Stent:, RCA and / or branches,   Thrombectomy:, RCA and / or branches     Complications:    - No complication     Indications:    - Non Q wave MI       - Coronary risk factors       - ECG changes      Conclusions      Procedure Summary      Single vessel acute occlusion / thrombotic proximal dominant RCA   Mild LV systolic dysfunction   Successful Thrombectomy RCA   Successful PTCA -ANATOLIY of the RCA   Integrilin was give IC and then IV      Recommendations      Post MI

## 2023-04-24 NOTE — OP NOTE
[facility]    CARDIAC CATHETERIZATION    Date:   4/24/2023  Patient name:  Bry Chavez  Date of admission:  4/23/2023  7:16 PM  MRN:   [id number]  YOB: 1955    Operators:  Primary:   Catrachito Bentley MD (Attending Physician)      Procedure performed:       [x] Left Heart Catheterization. [] Graft Angiography. [x] Left Ventriculography. [] Right Heart Catheterization. [x] Coronary Angiography. [] Aortic Valve Studies. [x] PCI:      [] Other:       Pre Procedure Conscious Sedation Data:  ASA Class:    [] I [] II [x] III [] IV    Mallampati Class:  [] I [] II [x] III [] IV      Indication:  [] STEMI      [] + Stress test  [x] ACS      [] + EKG Changes  [] Non Q MI       [] Significant Risk Factors  [] Recurrent Angina             [] Diabetes Mellitus    [] New LBBB      [] Uncontrolled HTN. [] CHF / Low EF changes     [] Abnormal CTA / Ca Score      Procedure:  Access:  [] Femoral  [x] Radial  artery       [x] Right  [] Left    Procedure: After informed consent was obtained with explanation of the risks and benefits, patient was brought to the cath lab. The access area was prepped and draped in sterile fashion. 1% lidocaine was used for local block. The artery was cannulated with 6  Fr sheath with brisk arterial blood return. The side port was frequently flushed and aspirated with normal saline.       Findings:      Estimated Blood Loss: Less than 20 mL    Conclusions:  Single vessel acute occlusion / thrombotic proximal dominant RCA   Mild LV systolic dysfunction   Successful Thrombectomy RCA   Successful PTCA -ANATOLIY of the RCA   Integrilin was give IC and then IV        Recommendations:  Post MI and stent protocol   Continue optimal medical therapy  Risk factor modification    ____________________________________________________________________    History and Risk Factors    [] Hypertension     [] Family history of CAD  [x] Hyperlipidemia     [] Cerebrovascular

## 2023-04-24 NOTE — CARE COORDINATION
Case Management Assessment  Initial Evaluation    Date/Time of Evaluation: 4/24/2023 11:12 AM  Assessment Completed by: Sia Moreno RN    If patient is discharged prior to next notation, then this note serves as note for discharge by case management. Patient Name: Tatiana Mccrary                   YOB: 1955  Diagnosis: NSTEMI (non-ST elevated myocardial infarction) [facility]) [I21.4]                   Date / Time: 4/23/2023  7:16 PM    Patient Admission Status: Inpatient   Readmission Risk (Low < 19, Mod (19-27), High > 27): Readmission Risk Score: 7.8    Current PCP: Jo-Ann Leblanc MD  PCP verified by CM? (P) Yes    Chart Reviewed: Yes      History Provided by: (P) Patient  Patient Orientation: (P) Alert and Oriented    Patient Cognition: (P) Alert    Hospitalization in the last 30 days (Readmission):  No    If yes, Readmission Assessment in CM Navigator will be completed. Advance Directives:      Code Status: Full Code   Patient's Primary Decision Maker is:        Discharge Planning:    Patient lives with: (P) Spouse/Significant Other, Parent Type of Home: (P) House  Primary Care Giver: (P) Self  Patient Support Systems include: (P) Spouse/Significant Other   Current Financial resources:    Current community resources:    Current services prior to admission: (P) None            Current DME:              Type of Home Care services:  (P) None    ADLS  Prior functional level: (P) Independent in ADLs/IADLs  Current functional level: (P) Independent in ADLs/IADLs    PT AM-PAC:   /24  OT AM-PAC:   /24    Family can provide assistance at DC: (P) Yes  Would you like Case Management to discuss the discharge plan with any other family members/significant others, and if so, who?     Plans to Return to Present Housing: (P) Yes  Other Identified Issues/Barriers to RETURNING to current housing: n/a  Potential Assistance needed at discharge: (P) N/A            Potential DME:    Patient expects to discharge to:

## 2023-04-24 NOTE — H&P
[facility]  Office: [address], DO, Vincent Ferguson, DO, Johanna Ortiz, DO, Nikkie Stevens, DO, Martin Santana MD, Genaro Looney MD, Princess Leeroy MD, Gisell Long MD,  Lesly Aguilar MD, Soni Bridges MD, Ladell Scheuermann, DO, Quincy Eubanks MD,  Brianna Torres MD, Godwin Solomon MD, Kp Cantu, DO, Kayla Villalta MD, Irma Thakkar MD, Zayanb Betancourt, DO, Rm Mast MD, Jannet Loredo MD, Katiuska Obrien MD, Len Turner MD,  Myron Vazquez, DO, Bryan Rivera MD,  Flory Gotti, CNP,  Maricruz Nino, CNP, Joel Mendes, CNP, Alla Tariq, CNP,  Waldo Ellsworth, [facility], Archana El, CNP, Dara Contreras, CNP, Alma Cyr, CNP, Arleen Alpers, CNP, Shannan Hinton, CNP, Jerene Oppenheim, PA-C, Gisela Govea, CNS, Pedro Luis Snow, CNP, Wali Ayon, CNP         [facility]    HISTORY AND PHYSICAL EXAMINATION            Date:   4/23/2023  Patient name:  Lilly Camacho  Date of admission:  4/23/2023  7:16 PM  MRN:   [id number]  Account:  [de-identified]  YOB: 1955  PCP:    Karen Reaves MD  Room:   2009/2009-01  Code Status:    Full Code    Chief Complaint:     Chief Complaint   Patient presents with    Chest Pain   \" I had chest pains\"    History Obtained From:     patient    History of Present Illness:     Lilly Camacho is a 69 y.o. male with chronic GERD, dyslipidemia who presents with Chest Pain   and is admitted to the hospital for the management of NSTEMI (non-ST elevated myocardial infarction) ([facility] [address] Patient describes gradual onset of chest pain after cutting wood on Friday. Initially thought it was musculoskeletal with midsternal pressure pain. States \"felt like something was sitting on my chest\".   Symptoms waxed and waned over the weekend with intermittent shortness of breath with difficulty breathing stating \"I just could not catch my breath\" with routine

## 2023-04-24 NOTE — PROGRESS NOTES
[facility]  Office: [address], DO, Malinda Michaelflores, DO, Chanda Crystals, DO, Raman Goldeusebiochristian Blood, DO, Flakito Smalls MD, Abhishek Ramirez MD, Vikki Wilson MD, Lisbeth Jacobo MD,  Celso Mae MD, Hailey Pahram MD, Radha Cash, DO, Nida Macias MD,  Nabila Suero MD, Fina Gonzalez MD, Philomena Bojorquez DO, Argenis Oneal MD, Fely Clark MD, Isrrael Huddleston DO, Ken Samuels MD, Lalito Arciniega MD, Hakan Langston MD, Chasidy Garcia MD,  Dewayne Brock DO, Florentin Sher MD,  Ramon Miller, [facility],  Aren Friedman, CNP, Kari Montalvo, CNP, Perley Gilford, CNP,  Asha Pinzon, [facility], Lynette Paris, CNP, Sara Muniz, CNP, Naima Sharp, CNP, Nerissa Coffey, CNP, Lawyer George, CNP, Aarti Worrell PA-C, Bright Schumacher, CNS, Cletis Hashimoto, CNP, Julieta Chris, [address] [place] [address]    Progress Note    4/24/2023    8:44 AM    Name:   Christian Sloan  MRN:     [id number]     [place]:      [de-identified]   Room:   2009/2009-01   Day:  1  Admit Date:  4/23/2023  7:16 PM    PCP:   Reyna Dick MD  Code Status:  Full Code    Subjective:     C/C:   Chief Complaint   Patient presents with    Chest Pain     Interval History Status: improved. Patient seen and examined at bedside, just came back from his cardiac cath, acute RCA lesion underwent thrombectomy and stenting with mild LV dysfunction and EF 45%. He is currently resting in bed comfortable stable  Blood pressure is on the lower side since  admitted  Patient currently denies any chest pain, shortness of breath, chills, fevers, nausea or vomiting. Patient vitals, labs and all providers notes were reviewed,from overnight shift and morning updates were noted and discussed with the nurse   no acute events overnight. Continue to improve overall with better breathing.   Brief History:     Christian Sloan is a 69 y.o. male with chronic GERD, dyslipidemia

## 2023-04-24 NOTE — ED PROVIDER NOTES
[facility]     Emergency Department     Faculty Attestation    I performed a history and physical examination of the patient and discussed management with the resident. I reviewed the residents note and agree with the documented findings including all diagnostic interpretations and plan of care. Any areas of disagreement are noted on the chart. I was personally present for the key portions of any procedures. I have documented in the chart those procedures where I was not present during the key portions. I have reviewed the emergency nurses triage note. I agree with the chief complaint, past medical history, past surgical history, allergies, medications, social and family history as documented unless otherwise noted below. Documentation of the HPI, Physical Exam and Medical Decision Making performed by danieliblukas is based on my personal performance of the HPI, PE and MDM. For Physician Assistant/ Nurse Practitioner cases/documentation I have personally evaluated this patient and have completed at least one if not all key elements of the E/M (history, physical exam, and MDM). Additional findings are as noted. Primary Care Physician: Marisela Goodman MD    Note Started: 11:40 PM EDT     VITAL SIGNS:   height is 6' (1.829 m) and weight is 225 lb (102.1 kg). His oral temperature is 98.6 °F (37 °C). His blood pressure is 99/61 and his pulse is 68. His respiration is 22 and oxygen saturation is 96%. Medical Decision Making  Chest pain. 2 days of symptoms. Transfer some [facility] due to elevated troponin of 2500. On heparin drip and nitro drip. Chest pain improved. On examination not acutely distressed, cardiac exam regular rate and rhythm does have midsystolic murmur, no prior history reported. Pulmonary clear bilaterally abdomen soft nontender nondistended. Impression is NSTEMI. Continue heparin and nitro drip. Admit to medicine.   Cardiology

## 2023-04-24 NOTE — PROGRESS NOTES
Still with continuous mid sternal chest pain with pain score 2/10. Provider aware. PRN morphine given.

## 2023-04-25 VITALS
TEMPERATURE: 98.1 F | SYSTOLIC BLOOD PRESSURE: 106 MMHG | HEIGHT: 72 IN | RESPIRATION RATE: 28 BRPM | OXYGEN SATURATION: 95 % | WEIGHT: 225 LBS | BODY MASS INDEX: 30.48 KG/M2 | HEART RATE: 63 BPM | DIASTOLIC BLOOD PRESSURE: 78 MMHG

## 2023-04-25 LAB
BNP SERPL-MCNC: 4768 PG/ML
MICROORGANISM SPEC CULT: NORMAL
SPECIMEN DESCRIPTION: NORMAL

## 2023-04-25 PROCEDURE — 6360000002 HC RX W HCPCS: Performed by: STUDENT IN AN ORGANIZED HEALTH CARE EDUCATION/TRAINING PROGRAM

## 2023-04-25 PROCEDURE — 6370000000 HC RX 637 (ALT 250 FOR IP): Performed by: STUDENT IN AN ORGANIZED HEALTH CARE EDUCATION/TRAINING PROGRAM

## 2023-04-25 PROCEDURE — 83880 ASSAY OF NATRIURETIC PEPTIDE: CPT

## 2023-04-25 PROCEDURE — 2580000003 HC RX 258: Performed by: NURSE PRACTITIONER

## 2023-04-25 PROCEDURE — 36415 COLL VENOUS BLD VENIPUNCTURE: CPT

## 2023-04-25 PROCEDURE — 6370000000 HC RX 637 (ALT 250 FOR IP): Performed by: FAMILY MEDICINE

## 2023-04-25 PROCEDURE — 6370000000 HC RX 637 (ALT 250 FOR IP): Performed by: NURSE PRACTITIONER

## 2023-04-25 PROCEDURE — 99239 HOSP IP/OBS DSCHRG MGMT >30: CPT | Performed by: FAMILY MEDICINE

## 2023-04-25 RX ORDER — FUROSEMIDE 20 MG/1
20 TABLET ORAL DAILY
Status: DISCONTINUED | OUTPATIENT
Start: 2023-04-25 | End: 2023-04-25 | Stop reason: HOSPADM

## 2023-04-25 RX ORDER — CIPROFLOXACIN 500 MG/1
500 TABLET, FILM COATED ORAL EVERY 12 HOURS SCHEDULED
Qty: 8 TABLET | Refills: 0 | Status: SHIPPED | OUTPATIENT
Start: 2023-04-25 | End: 2023-04-29

## 2023-04-25 RX ORDER — ERGOCALCIFEROL 1.25 MG/1
50000 CAPSULE ORAL WEEKLY
Qty: 12 CAPSULE | Refills: 1 | Status: SHIPPED | OUTPATIENT
Start: 2023-04-25

## 2023-04-25 RX ORDER — ASPIRIN 81 MG/1
81 TABLET, CHEWABLE ORAL DAILY
Qty: 30 TABLET | Refills: 3 | Status: SHIPPED | OUTPATIENT
Start: 2023-04-26

## 2023-04-25 RX ORDER — MIDODRINE HYDROCHLORIDE 5 MG/1
5 TABLET ORAL ONCE
Status: COMPLETED | OUTPATIENT
Start: 2023-04-25 | End: 2023-04-25

## 2023-04-25 RX ADMIN — EPTIFIBATIDE 2 MCG/KG/MIN: 0.75 INJECTION INTRAVENOUS at 01:54

## 2023-04-25 RX ADMIN — SODIUM CHLORIDE: 9 INJECTION, SOLUTION INTRAVENOUS at 01:55

## 2023-04-25 RX ADMIN — TICAGRELOR 90 MG: 90 TABLET ORAL at 08:05

## 2023-04-25 RX ADMIN — CIPROFLOXACIN 500 MG: 500 TABLET, FILM COATED ORAL at 08:06

## 2023-04-25 RX ADMIN — ASPIRIN 81 MG 81 MG: 81 TABLET ORAL at 08:06

## 2023-04-25 RX ADMIN — MIDODRINE HYDROCHLORIDE 5 MG: 5 TABLET ORAL at 12:01

## 2023-04-25 RX ADMIN — SODIUM CHLORIDE, PRESERVATIVE FREE 10 ML: 5 INJECTION INTRAVENOUS at 08:09

## 2023-04-25 RX ADMIN — FUROSEMIDE 20 MG: 20 TABLET ORAL at 12:36

## 2023-04-25 RX ADMIN — CHOLECALCIFEROL TAB 10 MCG (400 UNIT) 400 UNITS: 10 TAB at 08:05

## 2023-04-25 RX ADMIN — PANTOPRAZOLE SODIUM 40 MG: 40 TABLET, DELAYED RELEASE ORAL at 06:25

## 2023-04-25 NOTE — PLAN OF CARE
Problem: Discharge Planning  Goal: Discharge to home or other facility with appropriate resources  4/24/2023 2017 by Yesica Rousseau RN  Outcome: Progressing  4/24/2023 0909 by Joss Landa RN  Outcome: Progressing     Problem: Pain  Goal: Verbalizes/displays adequate comfort level or baseline comfort level  4/24/2023 2017 by Yesica Rousseau RN  Outcome: Progressing  4/24/2023 0909 by Joss Landa RN  Outcome: Progressing     Problem: ABCDS Injury Assessment  Goal: Absence of physical injury  4/24/2023 2017 by Yesica Rousseau RN  Outcome: Progressing  4/24/2023 0909 by Joss Landa RN  Outcome: Progressing     Problem: Safety - Adult  Goal: Free from fall injury  Outcome: Progressing

## 2023-04-25 NOTE — CARE COORDINATION
Spoke with patient regarding transitional plan of care. Pt to go home with wife. No other needs expressed at this time. 2nd IMM given.     Discharge [facility] Case Management Department  Written by: Aly Chauhan RN    Patient Name: Onelia Jasmine  Attending Provider: Smita Gonsales MD  Admit Date: 2023  7:16 PM  MRN: [id number]  Account: [de-identified]                     : 1955  Discharge Date: 23      Disposition: home    Aly Chauhan RN

## 2023-04-25 NOTE — DISCHARGE SUMMARY
[facility]  Office: [address], DO, Laureen Elder, DO, Radha Pedraza, DO, Alek London Blood, DO, Corry Rowland MD, Richie Kasper MD, José Hernandez MD, Vy Chao MD,  Gwendolyn Worthy MD, Elly Merchant MD, Maurizio Harrington, DO, Loly Jacob MD,  Melody Rouse MD, Disha Hairston MD, Nemo Lezama DO, Duglas Bowens MD, Sherrell Kitchen MD, Vero Hinkle, DO, Lane Hadley MD, Izaiah Caceres MD, Tere Ayala MD, Mirta Hairston MD,  Karrie Reagan, DO, Leah Burns MD,  Elvin Lacy, CNP,  Dara Karimi, CNP, Fidel Jo, CNP, Kayode Wolfe, CNP,  Jero Antony, [facility], Liza Hurtado, CNP, Vadim Oakley, CNP, Dustin Nobles, CNP, Oskar Maicas, CNP, Aristides Abdullahi, CNP, Yady Sam PA-C, Hannah Houston, CNS, Paul Beck, [facility], Timothy Ren, [facility]    Discharge Summary     Patient ID: Gavin Barney  :  1955   MRN: [id number]     ACCOUNT:  [de-identified]   Patient's PCP: Marisela Goodman MD  Admit Date: 2023   Discharge Date: 2023     Length of Stay: 2  Code Status:  Full Code  Admitting Physician: No admitting provider for patient encounter. Discharge Physician: Vy Chao MD     Active Discharge Diagnoses:     Hospital Problem Lists:  Principal Problem:    NSTEMI (non-ST elevated myocardial infarction) [facility])  Active Problems:    Chronic GERD    Dyslipidemia    Hyponatremia    Acute cystitis  Resolved Problems:    * No resolved hospital problems. *      Admission Condition:  poor     Discharged Condition: fair    Hospital Stay:     HPI:  The patient is a 69 y.o.  male who is admitted to the hospital for c/c of chest pain that started 2 days back while the patient was working in his garden. It was substernal, pressure like, non radiating, associated with shortness of breath and kept getting worse when he came to the ER for evaluation.  He

## 2023-04-25 NOTE — PROGRESS NOTES
[facility]  Progress Note                   Date:   4/25/2023  Patient name: Gualberto Mann  Date of admission:  4/23/2023  7:16 PM  MRN:   [id number]  YOB: 1955  PCP: Jillian Hassan MD    Reason for Admission: NSTEMI (non-ST elevated myocardial infarction) [facility]) [I21.4]    Subjective:       Clinical Changes /Abnormalities: Patient seen and examined in room with family. He denies chest pain and shortness of breath. Has complaints of dizziness when standing. Labs/vitals/tele reviewed. Discussed with RN. Review of Systems    Medications:   Scheduled Meds:   furosemide  20 mg Oral Daily    atorvastatin  80 mg Oral Nightly    ticagrelor  90 mg Oral BID    ciprofloxacin  500 mg Oral 2 times per day    pantoprazole  40 mg Oral QAM AC    [Held by provider] pravastatin  40 mg Oral BID    vitamin D3  400 Units Oral Daily    sodium chloride flush  5-40 mL IntraVENous 2 times per day    metoprolol tartrate  25 mg Oral BID    aspirin  81 mg Oral Daily     Continuous Infusions:   sodium chloride       CBC:   Recent Labs     04/23/23  1540 04/23/23 2129 04/24/23  0710   WBC 10.3 11.4* 9.6   HGB 13.1* 12.1* 11.3*    See Reflexed IPF Result 136*     BMP:    Recent Labs     04/23/23  1540   *   K 3.8   CL 98   CO2 22   BUN 13   CREATININE 0.92   GLUCOSE 108*     Hepatic:  Recent Labs     04/23/23 2129   AST 95*   ALT 36   BILITOT 1.4*   ALKPHOS 64     Troponin:   Recent Labs     04/23/23  1926 04/23/23 2129 04/23/23  2256   TROPHS 2,488* 2,533* 2,626*     BNP: No results for input(s): BNP in the last 72 hours. Lipids:   Recent Labs     04/24/23  0710   CHOL 98   HDL 46     INR: No results for input(s): INR in the last 72 hours. Cardiac cath 4/24/23  Findings:  LMCA: Normal 0% stenosis. LAD: Mild irregularities 20-30%. LCx: Mild irregularities 10-20%.      RCA: Large and dominant   100% stenosis proximal area   Minimal collateral   Filled with thrombus as

## 2023-04-25 NOTE — PROGRESS NOTES
Discharge instructions given to patient. Patient and wife displays understanding. IV's removed, safety maintained.

## 2023-04-26 LAB
EST. AVERAGE GLUCOSE BLD GHB EST-MCNC: 103 MG/DL
HBA1C MFR BLD: 5.2 % (ref 4–6)

## 2023-04-28 LAB
EKG ATRIAL RATE: 80 BPM
EKG P AXIS: 49 DEGREES
EKG P-R INTERVAL: 162 MS
EKG Q-T INTERVAL: 370 MS
EKG QRS DURATION: 96 MS
EKG QTC CALCULATION (BAZETT): 426 MS
EKG R AXIS: -29 DEGREES
EKG T AXIS: -18 DEGREES
EKG VENTRICULAR RATE: 80 BPM

## 2023-05-09 ENCOUNTER — HOSPITAL ENCOUNTER (OUTPATIENT)
Age: 68
Discharge: HOME | End: 2023-05-09
Payer: MEDICARE

## 2023-05-09 DIAGNOSIS — E78.2 MIXED HYPERLIPIDEMIA: ICD-10-CM

## 2023-05-09 LAB
ALT SERPL-CCNC: 13 U/L (ref 5–41)
AST SERPL-CCNC: 13 U/L
CHOLEST SERPL-MCNC: 112 MG/DL
CHOLESTEROL/HDL RATIO: 3.3
CK SERPL-CCNC: 44 U/L (ref 39–308)
HDLC SERPL-MCNC: 34 MG/DL
LDLC SERPL CALC-MCNC: 65 MG/DL (ref 0–130)
TRIGL SERPL-MCNC: 63 MG/DL

## 2023-05-09 PROCEDURE — 84450 TRANSFERASE (AST) (SGOT): CPT

## 2023-05-09 PROCEDURE — 80061 LIPID PANEL: CPT

## 2023-05-09 PROCEDURE — 36415 COLL VENOUS BLD VENIPUNCTURE: CPT

## 2023-05-09 PROCEDURE — 84460 ALANINE AMINO (ALT) (SGPT): CPT

## 2023-05-09 PROCEDURE — 82550 ASSAY OF CK (CPK): CPT

## 2023-06-19 ENCOUNTER — HOSPITAL ENCOUNTER (OUTPATIENT)
Dept: CARDIAC REHAB | Age: 68
Setting detail: THERAPIES SERIES
Discharge: HOME | End: 2023-06-19
Payer: MEDICARE

## 2023-06-19 VITALS — BODY MASS INDEX: 30.11 KG/M2 | WEIGHT: 222 LBS

## 2023-06-19 PROCEDURE — 93798 PHYS/QHP OP CAR RHAB W/ECG: CPT

## 2023-06-19 ASSESSMENT — EXERCISE STRESS TEST
PEAK_METS: 3.9
PEAK_HR: 88
PEAK_RPE: 11
PEAK_BP: 110/68

## 2023-06-21 ENCOUNTER — HOSPITAL ENCOUNTER (OUTPATIENT)
Dept: CARDIAC REHAB | Age: 68
Setting detail: THERAPIES SERIES
Discharge: HOME | End: 2023-06-21
Payer: MEDICARE

## 2023-06-21 VITALS — BODY MASS INDEX: 30.38 KG/M2 | WEIGHT: 224 LBS

## 2023-06-21 PROCEDURE — 93798 PHYS/QHP OP CAR RHAB W/ECG: CPT

## 2023-06-21 ASSESSMENT — EXERCISE STRESS TEST
PEAK_BP: 122/64
PEAK_METS: 3.8
PEAK_RPE: 12
PEAK_HR: 86

## 2023-06-23 ENCOUNTER — APPOINTMENT (OUTPATIENT)
Dept: CARDIAC REHAB | Age: 68
End: 2023-06-23
Payer: MEDICARE

## 2023-06-26 ENCOUNTER — APPOINTMENT (OUTPATIENT)
Dept: CARDIAC REHAB | Age: 68
End: 2023-06-26
Payer: MEDICARE

## 2023-06-28 ENCOUNTER — HOSPITAL ENCOUNTER (OUTPATIENT)
Dept: CARDIAC REHAB | Age: 68
Setting detail: THERAPIES SERIES
Discharge: HOME | End: 2023-06-28
Payer: MEDICARE

## 2023-06-28 ENCOUNTER — HOSPITAL ENCOUNTER (OUTPATIENT)
Dept: NON INVASIVE DIAGNOSTICS | Age: 68
Discharge: HOME | End: 2023-06-28
Payer: MEDICARE

## 2023-06-28 VITALS — WEIGHT: 224 LBS | BODY MASS INDEX: 30.38 KG/M2

## 2023-06-28 LAB
LV EF: 28 %
LVEF MODALITY: NORMAL

## 2023-06-28 PROCEDURE — 93798 PHYS/QHP OP CAR RHAB W/ECG: CPT

## 2023-06-28 PROCEDURE — 93306 TTE W/DOPPLER COMPLETE: CPT

## 2023-06-28 ASSESSMENT — EXERCISE STRESS TEST
PEAK_RPE: 11
PEAK_METS: 4.2
PEAK_BP: 128/80
PEAK_HR: 101

## 2023-06-30 ENCOUNTER — HOSPITAL ENCOUNTER (OUTPATIENT)
Dept: CARDIAC REHAB | Age: 68
Setting detail: THERAPIES SERIES
Discharge: HOME | End: 2023-06-30
Payer: MEDICARE

## 2023-06-30 VITALS — BODY MASS INDEX: 30.38 KG/M2 | WEIGHT: 224 LBS

## 2023-06-30 PROCEDURE — 93798 PHYS/QHP OP CAR RHAB W/ECG: CPT

## 2023-06-30 ASSESSMENT — EXERCISE STRESS TEST
PEAK_RPE: 12
PEAK_HR: 84
PEAK_BP: 122/70
PEAK_METS: 4.2

## 2023-07-03 ENCOUNTER — HOSPITAL ENCOUNTER (OUTPATIENT)
Dept: CARDIAC REHAB | Age: 68
Setting detail: THERAPIES SERIES
Discharge: HOME | End: 2023-07-03
Payer: MEDICARE

## 2023-07-03 ENCOUNTER — TELEPHONE (OUTPATIENT)
Dept: PHARMACY | Age: 68
End: 2023-07-03

## 2023-07-03 VITALS — WEIGHT: 221.9 LBS | BODY MASS INDEX: 30.1 KG/M2

## 2023-07-03 PROCEDURE — 93798 PHYS/QHP OP CAR RHAB W/ECG: CPT

## 2023-07-03 ASSESSMENT — EXERCISE STRESS TEST
PEAK_RPE: 12
PEAK_HR: 89
PEAK_METS: 5.2
PEAK_BP: 130/78

## 2023-07-03 NOTE — TELEPHONE ENCOUNTER
Filipe Simpson RN in [address] [place] [address] recommended that this patient would be a good candidate to have his heart failure medications managed by our service  Sent Referral to Jazmin Rodriguez for signature

## 2023-07-05 ENCOUNTER — HOSPITAL ENCOUNTER (OUTPATIENT)
Dept: CARDIAC REHAB | Age: 68
Setting detail: THERAPIES SERIES
Discharge: HOME | End: 2023-07-05
Payer: MEDICARE

## 2023-07-05 VITALS — BODY MASS INDEX: 30.11 KG/M2 | WEIGHT: 222 LBS

## 2023-07-05 PROCEDURE — 93798 PHYS/QHP OP CAR RHAB W/ECG: CPT

## 2023-07-05 ASSESSMENT — EXERCISE STRESS TEST
PEAK_RPE: 12.5
PEAK_HR: 97
PEAK_BP: 122/74
PEAK_METS: 5.8

## 2023-07-07 ENCOUNTER — HOSPITAL ENCOUNTER (OUTPATIENT)
Dept: CARDIAC REHAB | Age: 68
Setting detail: THERAPIES SERIES
Discharge: HOME | End: 2023-07-07
Payer: MEDICARE

## 2023-07-07 VITALS — BODY MASS INDEX: 30.08 KG/M2 | WEIGHT: 221.8 LBS

## 2023-07-07 PROCEDURE — 93798 PHYS/QHP OP CAR RHAB W/ECG: CPT

## 2023-07-07 ASSESSMENT — EXERCISE STRESS TEST
PEAK_METS: 5.2
PEAK_HR: 102
PEAK_RPE: 12
PEAK_BP: 112/70

## 2023-07-10 ENCOUNTER — TELEPHONE (OUTPATIENT)
Dept: PHARMACY | Age: 68
End: 2023-07-10

## 2023-07-10 ENCOUNTER — HOSPITAL ENCOUNTER (OUTPATIENT)
Dept: PHARMACY | Age: 68
Setting detail: THERAPIES SERIES
Discharge: HOME | End: 2023-07-10
Payer: MEDICARE

## 2023-07-10 ENCOUNTER — HOSPITAL ENCOUNTER (OUTPATIENT)
Dept: CARDIAC REHAB | Age: 68
Setting detail: THERAPIES SERIES
Discharge: HOME | End: 2023-07-10
Payer: MEDICARE

## 2023-07-10 VITALS
DIASTOLIC BLOOD PRESSURE: 72 MMHG | SYSTOLIC BLOOD PRESSURE: 125 MMHG | OXYGEN SATURATION: 97 % | BODY MASS INDEX: 29.91 KG/M2 | HEART RATE: 49 BPM | WEIGHT: 220.5 LBS

## 2023-07-10 VITALS — BODY MASS INDEX: 29.91 KG/M2 | WEIGHT: 220.5 LBS

## 2023-07-10 PROCEDURE — 99213 OFFICE O/P EST LOW 20 MIN: CPT

## 2023-07-10 PROCEDURE — 93798 PHYS/QHP OP CAR RHAB W/ECG: CPT

## 2023-07-10 RX ORDER — LISINOPRIL 2.5 MG/1
2.5 TABLET ORAL DAILY
Qty: 90 TABLET | Refills: 1 | Status: SHIPPED | OUTPATIENT
Start: 2023-07-10

## 2023-07-10 RX ORDER — METOPROLOL SUCCINATE 25 MG/1
25 TABLET, EXTENDED RELEASE ORAL DAILY
Qty: 30 TABLET | Refills: 3 | Status: SHIPPED | OUTPATIENT
Start: 2023-07-10

## 2023-07-10 RX ORDER — SACUBITRIL AND VALSARTAN 24; 26 MG/1; MG/1
1 TABLET, FILM COATED ORAL 2 TIMES DAILY
Qty: 60 TABLET | Refills: 3 | Status: SHIPPED
Start: 2023-07-10 | End: 2023-07-10 | Stop reason: CLARIF

## 2023-07-10 ASSESSMENT — EXERCISE STRESS TEST
PEAK_BP: 110/62
PEAK_RPE: 13
PEAK_BP: 110/62
PEAK_METS: 6.3
PEAK_HR: 93

## 2023-07-10 ASSESSMENT — EJECTION FRACTION: EF_VALUE: 45

## 2023-07-10 NOTE — PROGRESS NOTES
[facility]  Medication Management  Pharmacist  Heart Failure    [address], [address]  Phone: [phone]  Fax: [phone]      NAME: Kalyn Funk  MEDICAL RECORD NUMBER:  [id number]  AGE: 69 y.o. GENDER: male  : 1955  EPISODE DATE:  7/10/2023      Alla Gloria was referred to the [facility] Medication Management Service by Dr. Petrona Villanueva for heart failure services. Special Instructions per the Referral Include: Patient Education and Medication Management       ECHO EF%: 25-30% Date: 23        This visit was performed as:  THIS VISIT WAS PERFORMED AS: An in person visit. Protocols were followed with precautions to reduce the spread of COVID-19. Subjective   Alla Gloria is a 69 y.o. male here for the Heart Failure Services. They are here today for a comprehensive medication review including over the counter medications and herbal products, overall wellbeing assessment, transition of care, extensive education and any needed adjustments with updates and recommendations communicated to the referring physician. New [place] Heart Association Classification based on patient symptoms:  Class I: No limitation of physical activity. Ordinary physical activity does not cause undue fatigue, palpitation, dyspnea (shortness of breath). Shortness of Breath:   Denies shortness of breath    Other Heart Failure Findings:   Denies cough at night or when lying down  Denies lower extremity edema  Denies fluid weight gain  Denies unusual fatigue  Denies early saiety or abdominal fullness    General Findings:  No other general findings noted     Comments:    - States does not have any SOB. Helped son build a deck and never had any SOB. Discussed what heart failure and low EF means.      -Use salt substitute. Discussed maximum of 2000mg a day of sodium. Easts some canned veges, canned soups in winter. Drinks Water, tea, coffee - no powerade or gatorade.

## 2023-07-10 NOTE — TELEPHONE ENCOUNTER
Patient called to inform us he spoke to his insurance and he has to pay $550 deductible but then will be able to get Jardiance 10mg for $40 for a 90 day supply but Braulio Sarahy will be $144 for a 90 day supply. Would like to start on Jardiance 10mg at this time and writer agrees this would be a good idea. RX for Jardiance 10mg #90 take one tablet daily with 1 refill sent to [place] on [facility]. Patient indicates the Braulio Sarahy will be too expensive and would like to use the cheaper option we discussed. Patient started on lisinopril 2.5mg once a day. RX for lisinopril 2.5mg tablets #90 with 1 refill sent to [place] on [facility]. Lilia informed to cancel RX for  Braulio Sarahy. Patient asks about Spironolactone per our discussion earlier today. Discussed that I would like to sart lisinopril and monitor SrCr and potassium prior to starting spironolactone. Patient states understanding. Jordan Andersen RP,Pharm. D,, BCPS, CACP  7/10/2023  1:49 PM

## 2023-07-10 NOTE — FLOWSHEET NOTE
07/10/23 0813   Treatment Diagnosis   Treatment Diagnosis 1 NSTEMI   NSTEMI Date 04/23/23   Treatment Diagnosis 2 PCI   PCI Date 04/24/23   Referral Date 05/08/23   Co-morbidities Pulmonary disease  (HLD)   Oxygen Saturation / Titration    Stages of Change  Maintenance   Oxygen Intervention   Oxygen Use No   O2 Sat Greater Than 90% Yes   Nurse/Patient Discussion  YES   Individual Treatment Plan   ITP Visit Type Re-assessment   1st Date of Exercise  06/12/23   ITP Next Review Date 08/07/23   Visit #/Total Visits 12/36   EF% 45 %   Risk Stratification Moderate   ITP Exercise;Psychosocial;Tobacco;Nutrition;Education   Exercise    Stages of Change Action   Assisted Devices None   Exercise Prescription   Mode Treadmill;Bike;Stepper;Ergometer   Frequency per week 3   Duration Per Session 40 MIN   Intensity METS       6.3   RPE 13   Progression PROGRESSING   Resistance Training Yes   Exercise Blood Pressures   Resting /80   Peak /62   Is BP WDL Yes   Exercise Activity at Home   Type NONE CURRENTLY  (PT IS DOING REGULAR YARD WORK. ENCOUTAGED HOME EXERCISE ON OFF DAYS)   Resistance Training No   Exercise Education   Education Self pulse;Exercise safety;Signs/symptoms to report;RPE scale;Equipment orientation; Warm up/cool down;Physically active   Exercise Target Goal   Target Goal(s) Individual exercise RX;BP < 140/90 or < 130/80, if DM or CKD; Aerobic activity 30 + minutes/day  5 days/week   Patient Stated Exercise Goals TO LOSE WEIGHT   Psychosocial   Stages of Change Maintenance   Psychosocial Intervention   Interventions No intervention indicated   Currently Taking Psychotropic Meds No   Medication Changes No   Psychosocial Education   Education Benefits of CPR completion;Cardiac meds; Coping techniques; Environmental triggers; Impact self care behaviors on health;Relaxation techniques; Sexual activity; Signs/symptoms of depression   Psychosocial Target Goals   Target Goal(s) Assess presence or absence of

## 2023-07-10 NOTE — DISCHARGE INSTRUCTIONS
Switch from metoprolol tartrate to metoprolol succinate. Start:  metoprolol succinate 25mg once a day  Entresto 24-26 once a day    Weigh yourself each day and keep track of weight. Call our office with an increase of 3 or more lbs in one day or 5 or more lbs in one week. Bring completed patient assistance forms for Jardiance back to next appt.

## 2023-07-10 NOTE — TELEPHONE ENCOUNTER
Discontinue metoprolol tartrate and switch to metoprolol succinate. RX for metoprolol succinate 25mg once a day #30 with 3 refills sent to [facility] on [address]. Entresto 24-26 #60 with 3 refills sent to [facility] on [address]. Jordan Andersen RPH,Pharm. D,, [facility], CACP  7/10/2023  8:32 AM

## 2023-07-12 ENCOUNTER — HOSPITAL ENCOUNTER (OUTPATIENT)
Dept: CARDIAC REHAB | Age: 68
Setting detail: THERAPIES SERIES
Discharge: HOME | End: 2023-07-12
Payer: MEDICARE

## 2023-07-12 VITALS — BODY MASS INDEX: 29.91 KG/M2 | WEIGHT: 220.5 LBS

## 2023-07-12 PROCEDURE — 93798 PHYS/QHP OP CAR RHAB W/ECG: CPT

## 2023-07-12 ASSESSMENT — EXERCISE STRESS TEST
PEAK_RPE: 12
PEAK_HR: 93
PEAK_METS: 6.1
PEAK_BP: 132/86

## 2023-07-13 ENCOUNTER — TELEPHONE (OUTPATIENT)
Dept: PHARMACY | Age: 68
End: 2023-07-13

## 2023-07-13 NOTE — TELEPHONE ENCOUNTER
Spoke with Gray Weinstein at [facility]. Fax received.  Will get provider signature and fax back to us later today    Dimitri Espinoza, Pharm D, [address] [facility]  7/13/2023 3:28 PM

## 2023-07-13 NOTE — TELEPHONE ENCOUNTER
Received signed provider form for Jardiance patient assistance. Added to patient's filled out forms and faxed entire application to [facility] Patient Assistance.     Anyi Buck, Pharm D, BCPS  [facility]  7/13/2023 3:54 PM

## 2023-07-13 NOTE — TELEPHONE ENCOUNTER
Patient called to inquiry of status of Jardiance patient assistance program. Patient states he was able to pickup prescription for lisinopril and will start that tomorrow. Pt also picked up and started Toprol XL prescription. He was not able to  prescription for the Jardiance. States prescription was going to be $550. He has a deductible that he needs to meet prior to the cost being reduced to $40 for 90ds. Patient had dropped off his portion of the patient assistance forms yesterday. Clinic is waiting on provider signature. Form was faxed to [address] [place] [address], Dr. Akua Mills. Awaiting signature. Informed patient that it may take several weeks for approval once the forms are faxed in. Informed patient I would contact the physician's office to ensure fax was received and possibly expedite getting the signature. Patient appreciative. At this point, patient will start the new lisinopril and Toprol and will NOT be starting the Jardiance due to cost.     [facility], Left voicemail on the nursing line. Requesting call back to confirm fax for patient assistance was received and will be signed by Dr. Michael Montalvo.      Princess Castillo, Pharm D, [facility]  7/13/2023 2:58 PM

## 2023-07-14 ENCOUNTER — TELEPHONE (OUTPATIENT)
Dept: PHARMACY | Age: 68
End: 2023-07-14

## 2023-07-14 ENCOUNTER — APPOINTMENT (OUTPATIENT)
Dept: CARDIAC REHAB | Age: 68
End: 2023-07-14
Payer: MEDICARE

## 2023-07-14 NOTE — TELEPHONE ENCOUNTER
Pt called saying [employer] called and was missing page 4 from application. Refaxed complete application to [employer].     Joe Stout RPH,PharmD, BCACP  7/14/2023  2:40 PM

## 2023-07-17 ENCOUNTER — HOSPITAL ENCOUNTER (OUTPATIENT)
Dept: CARDIAC REHAB | Age: 68
Setting detail: THERAPIES SERIES
Discharge: HOME | End: 2023-07-17
Payer: MEDICARE

## 2023-07-17 VITALS — WEIGHT: 224 LBS | BODY MASS INDEX: 30.38 KG/M2

## 2023-07-17 PROCEDURE — 93798 PHYS/QHP OP CAR RHAB W/ECG: CPT

## 2023-07-17 ASSESSMENT — EXERCISE STRESS TEST
PEAK_RPE: 13
PEAK_BP: 122/76
PEAK_HR: 93
PEAK_METS: 7

## 2023-07-18 ENCOUNTER — HOSPITAL ENCOUNTER (OUTPATIENT)
Dept: OTHER | Age: 68
Discharge: HOME | End: 2023-07-18
Payer: MEDICARE

## 2023-07-18 ENCOUNTER — HOSPITAL ENCOUNTER (OUTPATIENT)
Age: 68
Discharge: HOME | End: 2023-07-18
Payer: MEDICARE

## 2023-07-18 VITALS
HEIGHT: 72 IN | BODY MASS INDEX: 30.64 KG/M2 | RESPIRATION RATE: 18 BRPM | WEIGHT: 226.2 LBS | SYSTOLIC BLOOD PRESSURE: 118 MMHG | HEART RATE: 52 BPM | OXYGEN SATURATION: 98 % | DIASTOLIC BLOOD PRESSURE: 78 MMHG

## 2023-07-18 LAB
ANION GAP SERPL CALCULATED.3IONS-SCNC: 11 MMOL/L (ref 9–17)
BNP SERPL-MCNC: 856 PG/ML
BUN SERPL-MCNC: 12 MG/DL (ref 8–23)
CHLORIDE SERPL-SCNC: 104 MMOL/L (ref 98–107)
CO2 SERPL-SCNC: 24 MMOL/L (ref 20–31)
CREAT SERPL-MCNC: 0.9 MG/DL (ref 0.7–1.2)
GFR SERPL CREATININE-BSD FRML MDRD: >60 ML/MIN/1.73M2
POTASSIUM SERPL-SCNC: 4.3 MMOL/L (ref 3.7–5.3)
SODIUM SERPL-SCNC: 139 MMOL/L (ref 135–144)

## 2023-07-18 PROCEDURE — 80051 ELECTROLYTE PANEL: CPT

## 2023-07-18 PROCEDURE — 36415 COLL VENOUS BLD VENIPUNCTURE: CPT

## 2023-07-18 PROCEDURE — 83880 ASSAY OF NATRIURETIC PEPTIDE: CPT

## 2023-07-18 PROCEDURE — 99202 OFFICE O/P NEW SF 15 MIN: CPT

## 2023-07-18 PROCEDURE — 84520 ASSAY OF UREA NITROGEN: CPT

## 2023-07-18 PROCEDURE — 82565 ASSAY OF CREATININE: CPT

## 2023-07-19 ENCOUNTER — HOSPITAL ENCOUNTER (OUTPATIENT)
Dept: CARDIAC REHAB | Age: 68
Setting detail: THERAPIES SERIES
Discharge: HOME | End: 2023-07-19
Payer: MEDICARE

## 2023-07-19 VITALS — BODY MASS INDEX: 30.38 KG/M2 | WEIGHT: 224 LBS

## 2023-07-19 PROCEDURE — 93798 PHYS/QHP OP CAR RHAB W/ECG: CPT

## 2023-07-19 ASSESSMENT — EXERCISE STRESS TEST
PEAK_RPE: 13
PEAK_HR: 116
PEAK_BP: 122/76
PEAK_METS: 7

## 2023-07-21 ENCOUNTER — HOSPITAL ENCOUNTER (OUTPATIENT)
Dept: CARDIAC REHAB | Age: 68
Setting detail: THERAPIES SERIES
Discharge: HOME | End: 2023-07-21
Payer: MEDICARE

## 2023-07-21 VITALS — BODY MASS INDEX: 30.07 KG/M2 | WEIGHT: 221.7 LBS

## 2023-07-21 PROCEDURE — 93798 PHYS/QHP OP CAR RHAB W/ECG: CPT

## 2023-07-21 ASSESSMENT — EXERCISE STRESS TEST
PEAK_METS: 7.1
PEAK_BP: 116/64
PEAK_HR: 111
PEAK_RPE: 12

## 2023-07-24 ENCOUNTER — HOSPITAL ENCOUNTER (OUTPATIENT)
Dept: CARDIAC REHAB | Age: 68
Setting detail: THERAPIES SERIES
Discharge: HOME | End: 2023-07-24
Payer: MEDICARE

## 2023-07-24 VITALS — BODY MASS INDEX: 29.67 KG/M2 | WEIGHT: 218.8 LBS

## 2023-07-24 PROCEDURE — 93798 PHYS/QHP OP CAR RHAB W/ECG: CPT

## 2023-07-24 ASSESSMENT — EXERCISE STRESS TEST
PEAK_BP: 110/80
PEAK_HR: 126
PEAK_METS: 7.2
PEAK_RPE: 13

## 2023-07-26 ENCOUNTER — HOSPITAL ENCOUNTER (OUTPATIENT)
Dept: CARDIAC REHAB | Age: 68
Setting detail: THERAPIES SERIES
Discharge: HOME | End: 2023-07-26
Payer: MEDICARE

## 2023-07-26 ENCOUNTER — APPOINTMENT (OUTPATIENT)
Dept: PHARMACY | Age: 68
End: 2023-07-26
Payer: MEDICARE

## 2023-07-26 VITALS — BODY MASS INDEX: 29.63 KG/M2 | WEIGHT: 218.5 LBS

## 2023-07-26 PROCEDURE — 93798 PHYS/QHP OP CAR RHAB W/ECG: CPT

## 2023-07-26 ASSESSMENT — EXERCISE STRESS TEST
PEAK_HR: 110
PEAK_METS: 7.2
PEAK_BP: 136/88
PEAK_RPE: 13

## 2023-07-28 ENCOUNTER — HOSPITAL ENCOUNTER (OUTPATIENT)
Dept: CARDIAC REHAB | Age: 68
Setting detail: THERAPIES SERIES
Discharge: HOME | End: 2023-07-28
Payer: MEDICARE

## 2023-07-28 VITALS — WEIGHT: 219.5 LBS | BODY MASS INDEX: 29.77 KG/M2

## 2023-07-28 PROCEDURE — 93798 PHYS/QHP OP CAR RHAB W/ECG: CPT

## 2023-07-28 ASSESSMENT — EXERCISE STRESS TEST
PEAK_RPE: 13
PEAK_METS: 7.3
PEAK_BP: 140/74
PEAK_HR: 116

## 2023-07-31 ENCOUNTER — HOSPITAL ENCOUNTER (OUTPATIENT)
Dept: CARDIAC REHAB | Age: 68
Setting detail: THERAPIES SERIES
Discharge: HOME | End: 2023-07-31
Payer: MEDICARE

## 2023-07-31 VITALS — BODY MASS INDEX: 29.77 KG/M2 | WEIGHT: 219.5 LBS

## 2023-07-31 PROCEDURE — 93798 PHYS/QHP OP CAR RHAB W/ECG: CPT

## 2023-07-31 ASSESSMENT — EXERCISE STRESS TEST
PEAK_BP: 102/68
PEAK_RPE: 13
PEAK_METS: 7.4
PEAK_HR: 114

## 2023-08-02 ENCOUNTER — HOSPITAL ENCOUNTER (OUTPATIENT)
Dept: CARDIAC REHAB | Age: 68
Setting detail: THERAPIES SERIES
Discharge: HOME | End: 2023-08-02
Payer: MEDICARE

## 2023-08-02 ENCOUNTER — HOSPITAL ENCOUNTER (OUTPATIENT)
Age: 68
Discharge: HOME | End: 2023-08-02
Payer: MEDICARE

## 2023-08-02 ENCOUNTER — HOSPITAL ENCOUNTER (OUTPATIENT)
Dept: OTHER | Age: 68
Discharge: HOME | End: 2023-08-02
Payer: MEDICARE

## 2023-08-02 VITALS — BODY MASS INDEX: 29.77 KG/M2 | WEIGHT: 219.5 LBS

## 2023-08-02 VITALS
WEIGHT: 219.5 LBS | HEART RATE: 67 BPM | SYSTOLIC BLOOD PRESSURE: 100 MMHG | OXYGEN SATURATION: 97 % | RESPIRATION RATE: 18 BRPM | DIASTOLIC BLOOD PRESSURE: 70 MMHG | BODY MASS INDEX: 29.73 KG/M2 | HEIGHT: 72 IN

## 2023-08-02 LAB
ANION GAP SERPL CALCULATED.3IONS-SCNC: 10 MMOL/L (ref 9–17)
BNP SERPL-MCNC: 447 PG/ML
BUN SERPL-MCNC: 13 MG/DL (ref 8–23)
CHLORIDE SERPL-SCNC: 103 MMOL/L (ref 98–107)
CO2 SERPL-SCNC: 22 MMOL/L (ref 20–31)
CREAT SERPL-MCNC: 0.8 MG/DL (ref 0.7–1.2)
GFR SERPL CREATININE-BSD FRML MDRD: >60 ML/MIN/1.73M2
POTASSIUM SERPL-SCNC: 4.4 MMOL/L (ref 3.7–5.3)
SODIUM SERPL-SCNC: 135 MMOL/L (ref 135–144)

## 2023-08-02 PROCEDURE — 84520 ASSAY OF UREA NITROGEN: CPT

## 2023-08-02 PROCEDURE — 36415 COLL VENOUS BLD VENIPUNCTURE: CPT

## 2023-08-02 PROCEDURE — 83880 ASSAY OF NATRIURETIC PEPTIDE: CPT

## 2023-08-02 PROCEDURE — 80051 ELECTROLYTE PANEL: CPT

## 2023-08-02 PROCEDURE — 82565 ASSAY OF CREATININE: CPT

## 2023-08-02 PROCEDURE — 99211 OFF/OP EST MAY X REQ PHY/QHP: CPT

## 2023-08-02 PROCEDURE — 93798 PHYS/QHP OP CAR RHAB W/ECG: CPT

## 2023-08-02 ASSESSMENT — EXERCISE STRESS TEST
PEAK_HR: 109
PEAK_METS: 6.8
PEAK_BP: 108/70
PEAK_RPE: 12

## 2023-08-04 ENCOUNTER — HOSPITAL ENCOUNTER (OUTPATIENT)
Dept: CARDIAC REHAB | Age: 68
Setting detail: THERAPIES SERIES
Discharge: HOME | End: 2023-08-04
Payer: MEDICARE

## 2023-08-04 VITALS — BODY MASS INDEX: 29.63 KG/M2 | WEIGHT: 218.5 LBS

## 2023-08-04 PROCEDURE — 93798 PHYS/QHP OP CAR RHAB W/ECG: CPT

## 2023-08-04 ASSESSMENT — EXERCISE STRESS TEST
PEAK_RPE: 13
PEAK_HR: 121
PEAK_METS: 7.4
PEAK_BP: 124/70

## 2023-08-07 ENCOUNTER — HOSPITAL ENCOUNTER (OUTPATIENT)
Dept: CARDIAC REHAB | Age: 68
Setting detail: THERAPIES SERIES
Discharge: HOME | End: 2023-08-07
Payer: MEDICARE

## 2023-08-07 VITALS — BODY MASS INDEX: 29.35 KG/M2 | WEIGHT: 216.4 LBS

## 2023-08-07 PROCEDURE — 93798 PHYS/QHP OP CAR RHAB W/ECG: CPT

## 2023-08-07 ASSESSMENT — EXERCISE STRESS TEST
PEAK_HR: 132
PEAK_BP: 130/72
PEAK_BP: 130/72
PEAK_METS: 7
PEAK_RPE: 12

## 2023-08-07 ASSESSMENT — EJECTION FRACTION: EF_VALUE: 45

## 2023-08-07 NOTE — FLOWSHEET NOTE
08/07/23 0801   Treatment Diagnosis   Treatment Diagnosis 1 NSTEMI   NSTEMI Date 04/23/23   Treatment Diagnosis 2 PCI   PCI Date 04/24/23   Referral Date 05/08/23   Significant Cardiovascular History History of heart failure   Co-morbidities Pulmonary disease  (HLD)   Oxygen Saturation / Titration    Stages of Change  Maintenance   Oxygen Intervention   Oxygen Use No   O2 Sat Greater Than 90% Yes   Nurse/Patient Discussion  YES   Individual Treatment Plan   ITP Visit Type Re-assessment   1st Date of Exercise  06/12/23   ITP Next Review Date 09/06/23   Visit #/Total Visits 23/36   EF% 45 %   Risk Stratification Moderate   ITP Exercise;Psychosocial;Tobacco;Nutrition;Education   Exercise    Stages of Change Action   Assisted Devices None   Exercise Prescription   Mode Treadmill;Bike;Stepper;Ergometer   Frequency per week 3   Duration Per Session 49 MIN   Intensity METS       7   RPE 12   Progression PROGRESSING   Resistance Training Yes   Exercise Blood Pressures   Resting /80   Peak /72   Is BP WDL Yes   Exercise Activity at Home   Type WALKING   Frequency 2-3 TIMES WEEKLY   Duration 30 MIN   Resistance Training No   Exercise Education   Education Self pulse;Exercise safety;Signs/symptoms to report;RPE scale;Equipment orientation; Warm up/cool down;Physically active   Exercise Target Goal   Target Goal(s) Individual exercise RX;BP < 140/90 or < 130/80, if DM or CKD; Aerobic activity 30 + minutes/day  5 days/week   Patient Stated Exercise Goals TO LOSE WEIGHT   Psychosocial   Stages of Change Maintenance   Psychosocial Intervention   Interventions No intervention indicated   Currently Taking Psychotropic Meds No   Medication Changes No   Psychosocial Education   Education Benefits of CPR completion;Cardiac meds; Coping techniques; Environmental triggers; Impact self care behaviors on health;Relaxation techniques; Sexual activity; Signs/symptoms of depression   Psychosocial Target Goals   Target Goal(s) Assess

## 2023-08-09 ENCOUNTER — HOSPITAL ENCOUNTER (OUTPATIENT)
Dept: CARDIAC REHAB | Age: 68
Setting detail: THERAPIES SERIES
Discharge: HOME | End: 2023-08-09
Payer: MEDICARE

## 2023-08-09 VITALS — BODY MASS INDEX: 29.32 KG/M2 | WEIGHT: 216.2 LBS

## 2023-08-09 PROCEDURE — 93798 PHYS/QHP OP CAR RHAB W/ECG: CPT

## 2023-08-09 PROCEDURE — 93797 PHYS/QHP OP CAR RHAB WO ECG: CPT

## 2023-08-09 ASSESSMENT — EXERCISE STRESS TEST
PEAK_METS: 7.3
PEAK_RPE: 13
PEAK_HR: 124
PEAK_BP: 118/70

## 2023-08-11 ENCOUNTER — HOSPITAL ENCOUNTER (OUTPATIENT)
Dept: CARDIAC REHAB | Age: 68
Setting detail: THERAPIES SERIES
Discharge: HOME | End: 2023-08-11
Payer: MEDICARE

## 2023-08-11 VITALS — WEIGHT: 216.5 LBS | BODY MASS INDEX: 29.36 KG/M2

## 2023-08-11 PROCEDURE — 93798 PHYS/QHP OP CAR RHAB W/ECG: CPT

## 2023-08-11 ASSESSMENT — EXERCISE STRESS TEST
PEAK_RPE: 13
PEAK_HR: 107
PEAK_METS: 7.4
PEAK_BP: 110/70

## 2023-08-14 ENCOUNTER — HOSPITAL ENCOUNTER (OUTPATIENT)
Dept: CARDIAC REHAB | Age: 68
Setting detail: THERAPIES SERIES
Discharge: HOME | End: 2023-08-14
Payer: MEDICARE

## 2023-08-14 VITALS — WEIGHT: 217 LBS | BODY MASS INDEX: 29.43 KG/M2

## 2023-08-14 PROCEDURE — 93798 PHYS/QHP OP CAR RHAB W/ECG: CPT

## 2023-08-14 ASSESSMENT — EXERCISE STRESS TEST
PEAK_HR: 106
PEAK_BP: 144/70
PEAK_RPE: 13
PEAK_METS: 7.6

## 2023-08-16 ENCOUNTER — HOSPITAL ENCOUNTER (OUTPATIENT)
Dept: CARDIAC REHAB | Age: 68
Setting detail: THERAPIES SERIES
Discharge: HOME | End: 2023-08-16
Payer: MEDICARE

## 2023-08-16 VITALS — BODY MASS INDEX: 29.43 KG/M2 | WEIGHT: 217 LBS

## 2023-08-16 PROCEDURE — 93798 PHYS/QHP OP CAR RHAB W/ECG: CPT

## 2023-08-16 ASSESSMENT — EXERCISE STRESS TEST
PEAK_METS: 7.6
PEAK_BP: 110/78
PEAK_HR: 112
PEAK_RPE: 13

## 2023-08-18 ENCOUNTER — HOSPITAL ENCOUNTER (OUTPATIENT)
Dept: CARDIAC REHAB | Age: 68
Setting detail: THERAPIES SERIES
Discharge: HOME | End: 2023-08-18
Payer: MEDICARE

## 2023-08-18 ENCOUNTER — HOSPITAL ENCOUNTER (OUTPATIENT)
Dept: PHARMACY | Age: 68
Setting detail: THERAPIES SERIES
Discharge: HOME | End: 2023-08-18
Payer: MEDICARE

## 2023-08-18 ENCOUNTER — TELEPHONE (OUTPATIENT)
Dept: PHARMACY | Age: 68
End: 2023-08-18

## 2023-08-18 ENCOUNTER — HOSPITAL ENCOUNTER (OUTPATIENT)
Age: 68
Setting detail: SPECIMEN
Discharge: HOME | End: 2023-08-18
Payer: MEDICARE

## 2023-08-18 VITALS
SYSTOLIC BLOOD PRESSURE: 107 MMHG | DIASTOLIC BLOOD PRESSURE: 72 MMHG | HEART RATE: 58 BPM | BODY MASS INDEX: 29.34 KG/M2 | OXYGEN SATURATION: 96 % | WEIGHT: 216.3 LBS

## 2023-08-18 VITALS — WEIGHT: 216 LBS | BODY MASS INDEX: 29.29 KG/M2

## 2023-08-18 LAB
BUN SERPL-MCNC: 16 MG/DL (ref 8–23)
CREAT SERPL-MCNC: 0.9 MG/DL (ref 0.7–1.2)
GFR SERPL CREATININE-BSD FRML MDRD: >60 ML/MIN/1.73M2
POTASSIUM SERPL-SCNC: 4.6 MMOL/L (ref 3.7–5.3)

## 2023-08-18 PROCEDURE — 84520 ASSAY OF UREA NITROGEN: CPT

## 2023-08-18 PROCEDURE — 36415 COLL VENOUS BLD VENIPUNCTURE: CPT

## 2023-08-18 PROCEDURE — 82565 ASSAY OF CREATININE: CPT

## 2023-08-18 PROCEDURE — 93798 PHYS/QHP OP CAR RHAB W/ECG: CPT

## 2023-08-18 PROCEDURE — 99213 OFFICE O/P EST LOW 20 MIN: CPT

## 2023-08-18 PROCEDURE — 84132 ASSAY OF SERUM POTASSIUM: CPT

## 2023-08-18 RX ORDER — SPIRONOLACTONE 25 MG/1
25 TABLET ORAL DAILY
Qty: 90 TABLET | Refills: 1 | Status: SHIPPED | OUTPATIENT
Start: 2023-08-18

## 2023-08-18 ASSESSMENT — EXERCISE STRESS TEST
PEAK_BP: 108/70
PEAK_RPE: 12
PEAK_HR: 106
PEAK_METS: 7.6

## 2023-08-18 NOTE — DISCHARGE INSTRUCTIONS
Continue Toprol XL 25 mg Daily  Continue Lisinopril 2.5 mg daily  Continue Jardiance 10 mg daily  Add Spironolactone 25 mg daily  Read Labels   Watch Sodium Intake  Continue to exercise   Solitario Angelo or GULSHAN  Check Daily weight  Notify our clinic of weight gain of 3 pounds in one day or 5 pounds in one week

## 2023-08-18 NOTE — PROGRESS NOTES
[facility]  Medication Management  Pharmacist  Heart Failure    [address], [address]  Phone: [phone]  Fax: [phone]      NAME: Gray Mao  MEDICAL RECORD NUMBER:  [id number]  AGE: 69 y.o. GENDER: male  : 1955  EPISODE DATE:  2023      Thania Cuevas was referred to the [facility] Medication Management Service by Dr. Bethany Herron for heart failure services. Special Instructions per the Referral Include: Patient Education and Medication Management    Dry weight: 216.3 lb pounds     ECHO EF%: 25-30 Date: 23         This visit was performed as:  THIS VISIT WAS PERFORMED AS: An in person visit. Protocols were followed with precautions to reduce the spread of COVID-19. Subjective   Thania Cuevas is a 69 y.o. male here for the Heart Failure Services. They are here today for a comprehensive medication review including over the counter medications and herbal products, overall wellbeing assessment, transition of care, extensive education and any needed adjustments with updates and recommendations communicated to the referring physician. New York Heart Association Classification based on patient symptoms:  Class II: Slight limitation of physical activity. Comfortable at rest. Ordinary physical activity results in fatigue, palpitation, dyspnea (shortness of breath). Shortness of Breath:   Denies shortness of breath    Other Heart Failure Findings:   Denies cough at night or when lying down  Denies lower extremity edema  Denies fluid weight gain  Denies unusual fatigue  Denies early saiety or abdominal fullness    General Findings:  Sleeps with one pillow      Comments:    - States feels better now than in recent years  Weight 216.3 lb  /72  Pulse 58  O2 Sat 96%  Todays Labs:  Potassium 4.6  Creatinine 0.9    Vaccines;    Covid  Primary vaccines and one booster  Flu      Seasonal   Plan 2023  Exercise:  CHF Clinic, yard work

## 2023-08-21 ENCOUNTER — HOSPITAL ENCOUNTER (OUTPATIENT)
Dept: CARDIAC REHAB | Age: 68
Setting detail: THERAPIES SERIES
Discharge: HOME | End: 2023-08-21
Payer: MEDICARE

## 2023-08-21 VITALS — BODY MASS INDEX: 29.36 KG/M2 | WEIGHT: 216.5 LBS

## 2023-08-21 PROCEDURE — 93798 PHYS/QHP OP CAR RHAB W/ECG: CPT

## 2023-08-21 ASSESSMENT — EXERCISE STRESS TEST
PEAK_HR: 118
PEAK_BP: 140/80
PEAK_METS: 7.8
PEAK_RPE: 13

## 2023-08-22 ENCOUNTER — TELEPHONE (OUTPATIENT)
Dept: PHARMACY | Age: 68
End: 2023-08-22

## 2023-08-22 NOTE — TELEPHONE ENCOUNTER
Patient called clinic today to ask if we had sent a prescription to [facility] for Spironolactone 25 mg. Patient had an appointment on 08/18/23 for Heart Failure follow up visit. Reviewed the documented note of last visit with the patient regarding the new prescription that was sent to [facility]. Explained it was meant to maximize therapeutic outcomes for heart failure. Instructed patient to call back if he had any further questions. Patient was just unsure who had sent in the prescription to the pharmacy.        Palmira Soares, PharmD  PGY1 Pharmacy Resident   [facility]

## 2023-08-23 ENCOUNTER — HOSPITAL ENCOUNTER (OUTPATIENT)
Dept: CARDIAC REHAB | Age: 68
Setting detail: THERAPIES SERIES
Discharge: HOME | End: 2023-08-23
Payer: MEDICARE

## 2023-08-23 VITALS — BODY MASS INDEX: 29.29 KG/M2 | WEIGHT: 216 LBS

## 2023-08-23 PROCEDURE — 93798 PHYS/QHP OP CAR RHAB W/ECG: CPT

## 2023-08-23 PROCEDURE — 93797 PHYS/QHP OP CAR RHAB WO ECG: CPT

## 2023-08-23 ASSESSMENT — EXERCISE STRESS TEST
PEAK_RPE: 12
PEAK_HR: 104
PEAK_BP: 110/72
PEAK_METS: 7.6

## 2023-08-25 ENCOUNTER — HOSPITAL ENCOUNTER (OUTPATIENT)
Dept: CARDIAC REHAB | Age: 68
Setting detail: THERAPIES SERIES
Discharge: HOME | End: 2023-08-25
Payer: MEDICARE

## 2023-08-25 VITALS — WEIGHT: 216.4 LBS | BODY MASS INDEX: 29.35 KG/M2

## 2023-08-25 PROCEDURE — 93798 PHYS/QHP OP CAR RHAB W/ECG: CPT

## 2023-08-25 ASSESSMENT — EXERCISE STRESS TEST
PEAK_BP: 110/76
PEAK_METS: 8
PEAK_RPE: 13
PEAK_HR: 119

## 2023-08-28 ENCOUNTER — HOSPITAL ENCOUNTER (OUTPATIENT)
Dept: CARDIAC REHAB | Age: 68
Setting detail: THERAPIES SERIES
Discharge: HOME | End: 2023-08-28
Payer: MEDICARE

## 2023-08-28 VITALS — WEIGHT: 213.9 LBS | BODY MASS INDEX: 29.01 KG/M2

## 2023-08-28 PROCEDURE — 93798 PHYS/QHP OP CAR RHAB W/ECG: CPT

## 2023-08-28 ASSESSMENT — EXERCISE STRESS TEST
PEAK_RPE: 13
PEAK_BP: 98/68
PEAK_METS: 8
PEAK_HR: 117

## 2023-08-30 ENCOUNTER — HOSPITAL ENCOUNTER (OUTPATIENT)
Dept: OTHER | Age: 68
Discharge: HOME | End: 2023-08-30
Payer: MEDICARE

## 2023-08-30 ENCOUNTER — HOSPITAL ENCOUNTER (OUTPATIENT)
Age: 68
Discharge: HOME | End: 2023-08-30
Payer: MEDICARE

## 2023-08-30 ENCOUNTER — HOSPITAL ENCOUNTER (OUTPATIENT)
Dept: CARDIAC REHAB | Age: 68
Setting detail: THERAPIES SERIES
Discharge: HOME | End: 2023-08-30
Payer: MEDICARE

## 2023-08-30 VITALS — WEIGHT: 213.9 LBS | BODY MASS INDEX: 29.01 KG/M2

## 2023-08-30 VITALS
HEART RATE: 59 BPM | WEIGHT: 214.6 LBS | DIASTOLIC BLOOD PRESSURE: 72 MMHG | RESPIRATION RATE: 18 BRPM | OXYGEN SATURATION: 98 % | BODY MASS INDEX: 29.07 KG/M2 | SYSTOLIC BLOOD PRESSURE: 92 MMHG | HEIGHT: 72 IN

## 2023-08-30 LAB
ANION GAP SERPL CALCULATED.3IONS-SCNC: 11 MMOL/L (ref 9–17)
BNP SERPL-MCNC: 306 PG/ML
BUN SERPL-MCNC: 15 MG/DL (ref 8–23)
CHLORIDE SERPL-SCNC: 101 MMOL/L (ref 98–107)
CO2 SERPL-SCNC: 23 MMOL/L (ref 20–31)
CREAT SERPL-MCNC: 1 MG/DL (ref 0.7–1.2)
GFR SERPL CREATININE-BSD FRML MDRD: >60 ML/MIN/1.73M2
POTASSIUM SERPL-SCNC: 4.9 MMOL/L (ref 3.7–5.3)
SODIUM SERPL-SCNC: 135 MMOL/L (ref 135–144)

## 2023-08-30 PROCEDURE — 83880 ASSAY OF NATRIURETIC PEPTIDE: CPT

## 2023-08-30 PROCEDURE — 36415 COLL VENOUS BLD VENIPUNCTURE: CPT

## 2023-08-30 PROCEDURE — 99211 OFF/OP EST MAY X REQ PHY/QHP: CPT

## 2023-08-30 PROCEDURE — 82565 ASSAY OF CREATININE: CPT

## 2023-08-30 PROCEDURE — 80051 ELECTROLYTE PANEL: CPT

## 2023-08-30 PROCEDURE — 93798 PHYS/QHP OP CAR RHAB W/ECG: CPT

## 2023-08-30 PROCEDURE — 93797 PHYS/QHP OP CAR RHAB WO ECG: CPT

## 2023-08-30 PROCEDURE — 84520 ASSAY OF UREA NITROGEN: CPT

## 2023-08-30 ASSESSMENT — PATIENT HEALTH QUESTIONNAIRE - PHQ9
SUM OF ALL RESPONSES TO PHQ9 QUESTIONS 1 & 2: 0
SUM OF ALL RESPONSES TO PHQ QUESTIONS 1-9: 0
1. LITTLE INTEREST OR PLEASURE IN DOING THINGS: 0
2. FEELING DOWN, DEPRESSED OR HOPELESS: 0

## 2023-08-30 ASSESSMENT — EXERCISE STRESS TEST
PEAK_BP: 112/74
PEAK_HR: 113
PEAK_METS: 8.2
PEAK_RPE: 13

## 2023-08-30 ASSESSMENT — EJECTION FRACTION: EF_VALUE: 45

## 2023-08-30 NOTE — FLOWSHEET NOTE
08/30/23 5539   Treatment Diagnosis   Treatment Diagnosis 1 NSTEMI   NSTEMI Date 04/23/23   Treatment Diagnosis 2 PCI   PCI Date 04/24/23   Referral Date 05/08/23   Significant Cardiovascular History History of heart failure   Co-morbidities Pulmonary disease  (HLD)   Oxygen Saturation / Titration    Stages of Change  Maintenance   Oxygen Intervention   Oxygen Use No   O2 Sat Greater Than 90% Yes   Nurse/Patient Discussion  YES   Individual Treatment Plan   ITP Visit Type Discharge, completed program   1st Date of Exercise  06/12/23   ITP Next Review Date 09/06/23   Visit #/Total Visits 35&36/36   EF% 45 %   Risk Stratification Moderate   ITP Exercise;Psychosocial;Tobacco;Nutrition;Education   Exercise    Stages of Change Action   Assisted Devices None   Exercise Prescription   Mode Treadmill;Bike;Stepper;Ergometer   Frequency per week 3   Duration Per Session 49 MIN   Intensity METS       8.2   RPE 13   Progression completion   Resistance Training Yes   Exercise Blood Pressures   Resting BP 92/72   Exercise Activity at Home   Type WALKING   Frequency 2-3 TIMES WEEKLY   Duration 30 MIN   Resistance Training No   Exercise Education   Education Self pulse;Exercise safety;Signs/symptoms to report;RPE scale;Equipment orientation; Warm up/cool down;Physically active   Exercise Target Goal   Target Goal(s) Individual exercise RX;BP < 140/90 or < 130/80, if DM or CKD; Aerobic activity 30 + minutes/day  5 days/week   Patient Stated Exercise Goals TO LOSE WEIGHT   Psychosocial   Stages of Change Maintenance   Psychosocial Intervention   Interventions No intervention indicated   Currently Taking Psychotropic Meds No   Medication Changes No   Psychosocial Education   Education Benefits of CPR completion;Cardiac meds; Coping techniques; Environmental triggers; Impact self care behaviors on health;Relaxation techniques; Sexual activity; Signs/symptoms of depression   Psychosocial Target Goals   Target Goal(s) Assess presence or

## 2023-08-30 NOTE — PROGRESS NOTES
Pt completed 32 exercise visits, 3 classes and 1 admission appt. ITP updated, PHQ, DASI, and Cardiac Knowledge Test completed, medications reviewed and updated as needed. Patient provided with discharge AVS and confidential survey. Pt is considering  participation in Phase 3 cardiac rehab and will call to let us know. Pt has met all 3 goals. Pt states that his strength and endurance have improved since starting the program. He has also lost 9lbs.

## 2023-08-31 ENCOUNTER — TELEPHONE (OUTPATIENT)
Dept: PHARMACY | Age: 68
End: 2023-08-31

## 2023-09-08 ENCOUNTER — TELEPHONE (OUTPATIENT)
Dept: PHARMACY | Age: 68
End: 2023-09-08

## 2023-09-08 RX ORDER — METOPROLOL SUCCINATE 25 MG/1
25 TABLET, EXTENDED RELEASE ORAL DAILY
Qty: 90 TABLET | Refills: 1 | Status: SHIPPED | OUTPATIENT
Start: 2023-09-08

## 2023-09-08 NOTE — TELEPHONE ENCOUNTER
Pharmacy requested refills for metoprolol succinate 25 mg. Pharmacy requested 90-days-supply. [facility].     Irasema Meehan RPH,PharmD, BCACP  9/8/2023  1:51 PM'

## 2023-09-12 ENCOUNTER — HOSPITAL ENCOUNTER (OUTPATIENT)
Dept: PHARMACY | Age: 68
Setting detail: THERAPIES SERIES
Discharge: HOME | End: 2023-09-12
Payer: COMMERCIAL

## 2023-09-12 ENCOUNTER — TELEPHONE (OUTPATIENT)
Dept: PHARMACY | Age: 68
End: 2023-09-12

## 2023-09-12 ENCOUNTER — TELEPHONE (OUTPATIENT)
Dept: OTHER | Age: 68
End: 2023-09-12

## 2023-09-12 VITALS
BODY MASS INDEX: 29.09 KG/M2 | SYSTOLIC BLOOD PRESSURE: 115 MMHG | HEART RATE: 58 BPM | DIASTOLIC BLOOD PRESSURE: 74 MMHG | WEIGHT: 214.5 LBS | OXYGEN SATURATION: 95 %

## 2023-09-12 PROCEDURE — 99213 OFFICE O/P EST LOW 20 MIN: CPT

## 2023-09-12 RX ORDER — SPIRONOLACTONE 25 MG/1
25 TABLET ORAL DAILY
Qty: 90 TABLET | Refills: 1 | Status: SHIPPED | OUTPATIENT
Start: 2023-09-12

## 2023-09-12 NOTE — PROGRESS NOTES
Pt had appt with Med Management this morning, ML for pt. Appt with writer for CHF clinic coordinated with next Med Management appt 10/10/2023. Pt instructed to call back if this doesn't work for pt.

## 2023-09-12 NOTE — DISCHARGE INSTRUCTIONS
Continue Toprol XL 25 mg daily  Continue Lisinopril 2.5 daily  Continue Jardiance 10 mg daily  Continue Spironolactone 25 mg daily'  Read Labels  Watch Sodium Intake  Continue to exercise [place]  Check Daily weight and record in logbook  Notify our clinic of weight gain of 3 pounds in one day or 5 pound in one week

## 2023-09-15 ENCOUNTER — TELEPHONE (OUTPATIENT)
Dept: PHARMACY | Age: 68
End: 2023-09-15

## 2023-09-15 NOTE — TELEPHONE ENCOUNTER
Called patient to let him know that refill for Jardiance 10 mg put thru to [facility] patient assistance  Order For jardiance 25 mg to [facility] cancelled

## 2023-10-10 ENCOUNTER — HOSPITAL ENCOUNTER (OUTPATIENT)
Dept: OTHER | Age: 68
Discharge: HOME | End: 2023-10-10

## 2023-10-10 ENCOUNTER — HOSPITAL ENCOUNTER (OUTPATIENT)
Age: 68
Setting detail: SPECIMEN
Discharge: HOME | End: 2023-10-10
Payer: MEDICARE

## 2023-10-10 ENCOUNTER — HOSPITAL ENCOUNTER (OUTPATIENT)
Dept: PHARMACY | Age: 68
Setting detail: THERAPIES SERIES
Discharge: HOME | End: 2023-10-10
Payer: MEDICARE

## 2023-10-10 VITALS
DIASTOLIC BLOOD PRESSURE: 62 MMHG | HEIGHT: 72 IN | BODY MASS INDEX: 28.99 KG/M2 | RESPIRATION RATE: 18 BRPM | WEIGHT: 214 LBS | HEART RATE: 49 BPM | SYSTOLIC BLOOD PRESSURE: 118 MMHG | OXYGEN SATURATION: 98 %

## 2023-10-10 VITALS
DIASTOLIC BLOOD PRESSURE: 66 MMHG | SYSTOLIC BLOOD PRESSURE: 105 MMHG | BODY MASS INDEX: 29.02 KG/M2 | WEIGHT: 214 LBS | HEART RATE: 52 BPM | OXYGEN SATURATION: 95 %

## 2023-10-10 LAB
ANION GAP SERPL CALCULATED.3IONS-SCNC: 12 MMOL/L (ref 9–17)
BNP SERPL-MCNC: 247 PG/ML
BUN SERPL-MCNC: 15 MG/DL (ref 8–23)
CALCIUM SERPL-MCNC: 9.3 MG/DL (ref 8.6–10.4)
CHLORIDE SERPL-SCNC: 105 MMOL/L (ref 98–107)
CO2 SERPL-SCNC: 21 MMOL/L (ref 20–31)
CREAT SERPL-MCNC: 0.9 MG/DL (ref 0.7–1.2)
GFR SERPL CREATININE-BSD FRML MDRD: >60 ML/MIN/1.73M2
GLUCOSE SERPL-MCNC: 105 MG/DL (ref 70–99)
POTASSIUM SERPL-SCNC: 4.3 MMOL/L (ref 3.7–5.3)
SODIUM SERPL-SCNC: 138 MMOL/L (ref 135–144)

## 2023-10-10 PROCEDURE — 36415 COLL VENOUS BLD VENIPUNCTURE: CPT

## 2023-10-10 PROCEDURE — 83880 ASSAY OF NATRIURETIC PEPTIDE: CPT

## 2023-10-10 PROCEDURE — 80048 BASIC METABOLIC PNL TOTAL CA: CPT

## 2023-10-10 PROCEDURE — 99213 OFFICE O/P EST LOW 20 MIN: CPT | Performed by: PHARMACIST

## 2023-10-10 NOTE — DISCHARGE INSTRUCTIONS
Continue:  - Toprol Xl 25 mg daily  - Lisinopril 2.5 mg daily  - Jardiance 10 mg daily  - Spironolactone 25 mg daily      - Daily weights  Call with weight gain of 3 pounds per day or 5 pounds per week  Call with weight above dry weight  - Call right away with signs/symptoms of heart failure  - Limit sodium to 2000mg per day and limit fluids to 64oz.    - Avoid NSAIDs

## 2023-11-07 ENCOUNTER — HOSPITAL ENCOUNTER (OUTPATIENT)
Dept: OTHER | Age: 68
Discharge: HOME | End: 2023-11-07
Payer: MEDICARE

## 2023-11-07 ENCOUNTER — HOSPITAL ENCOUNTER (OUTPATIENT)
Age: 68
Discharge: HOME | End: 2023-11-07
Payer: MEDICARE

## 2023-11-07 VITALS
HEART RATE: 66 BPM | BODY MASS INDEX: 28.85 KG/M2 | DIASTOLIC BLOOD PRESSURE: 80 MMHG | OXYGEN SATURATION: 95 % | HEIGHT: 72 IN | RESPIRATION RATE: 18 BRPM | SYSTOLIC BLOOD PRESSURE: 114 MMHG | WEIGHT: 213 LBS

## 2023-11-07 LAB
ANION GAP SERPL CALCULATED.3IONS-SCNC: 9 MMOL/L (ref 9–17)
BNP SERPL-MCNC: 447 PG/ML
BUN SERPL-MCNC: 15 MG/DL (ref 8–23)
CHLORIDE SERPL-SCNC: 103 MMOL/L (ref 98–107)
CO2 SERPL-SCNC: 24 MMOL/L (ref 20–31)
CREAT SERPL-MCNC: 1 MG/DL (ref 0.7–1.2)
GFR SERPL CREATININE-BSD FRML MDRD: >60 ML/MIN/1.73M2
POTASSIUM SERPL-SCNC: 4.7 MMOL/L (ref 3.7–5.3)
SODIUM SERPL-SCNC: 136 MMOL/L (ref 135–144)

## 2023-11-07 PROCEDURE — 82565 ASSAY OF CREATININE: CPT

## 2023-11-07 PROCEDURE — 36415 COLL VENOUS BLD VENIPUNCTURE: CPT

## 2023-11-07 PROCEDURE — 80051 ELECTROLYTE PANEL: CPT

## 2023-11-07 PROCEDURE — 99211 OFF/OP EST MAY X REQ PHY/QHP: CPT

## 2023-11-07 PROCEDURE — 83880 ASSAY OF NATRIURETIC PEPTIDE: CPT

## 2023-11-07 PROCEDURE — 84520 ASSAY OF UREA NITROGEN: CPT

## 2023-11-07 NOTE — PROGRESS NOTES
[address]      [address], [address]  Phone: [phone]  Fax: [phone]      NAME: Nargis Harrison  MEDICAL RECORD NUMBER:  [id number]  AGE: 77 y.o. GENDER: male  : 1955  EPISODE DATE:  2023      Mayela Clancy was referred to the [facility] Failure Clinic by Dr. Zac Ventura for heart failure services. Weight: 213 pounds     ECHO EF%: 25-30% Date: 2023      Recent Cardiology follow-up apt: May 2023  Follow-up apt recommended: 2024 with Dr. Lindy Britton  Upcoming testing: N/A  Medication Management: Yes  Nephrologist: N/A     Mayela Clancy is a 77 y.o. male here for the Heart Failure Services. They are here today for a Heart Failure assessment/consultation, monitoring medication effectiveness, reviewing necessary labs, transition of care, extensive Heart Failure education, reporting any concerns or symptoms and obtaining any necessary medication adjustments or orders from the patients health care team.    Shortness of Breath:   Denies shortness of breath    Other Heart Failure Findings:   Denies cough at night or when lying down  Denies lower extremity edema  Denies fluid weight gain  Denies unusual fatigue  Denies early saiety or abdominal fullness    General Findings:  No other general findings noted     Respiratory:    Assessment  Charting Type: Reassessment  Reassessment Performed: No change to previous assessment    Breath Sounds  Right Upper Lobe: Clear  Right Middle Lobe: Expiratory wheezes (CLEARED WITH COUGH)  Right Lower Lobe: Clear  Left Upper Lobe: Clear  Left Lower Lobe: Expiratory wheezes (CLEARED WITH COUGH)    Cough/Sputum  Cough: None     Cadiac Assessment  S-1 S-2, regular, heart tones distant/muffled  Denies CP/SOB/Fatigue    Peripheral Vascular  Peripheral Vascular (WDL): Within Defined Limits  Edema: None      Comments:    - pt is feeling well, continues to exercise at home several days a week.  Per pt he

## 2023-12-01 ENCOUNTER — TELEPHONE (OUTPATIENT)
Dept: PHARMACY | Age: 68
End: 2023-12-01

## 2023-12-01 NOTE — TELEPHONE ENCOUNTER
Patient is scheduled for 12/5 8:30 in the [facility] medication management clinic. The clinic does not have a provider able to see the patient on that date. Attempted to reach patient to reschedule appt. Message left for patient to call the clinic back so that appt could be rescheduled.    Liam Celaya, Pharm D, [facility]  12/1/2023 4:53 PM

## 2023-12-13 ENCOUNTER — TELEPHONE (OUTPATIENT)
Dept: PHARMACY | Age: 68
End: 2023-12-13

## 2023-12-13 ENCOUNTER — HOSPITAL ENCOUNTER (OUTPATIENT)
Age: 68
Setting detail: SPECIMEN
Discharge: HOME | End: 2023-12-13
Payer: MEDICARE

## 2023-12-13 ENCOUNTER — HOSPITAL ENCOUNTER (OUTPATIENT)
Dept: PHARMACY | Age: 68
Setting detail: THERAPIES SERIES
Discharge: HOME | End: 2023-12-13
Payer: MEDICARE

## 2023-12-13 VITALS
SYSTOLIC BLOOD PRESSURE: 113 MMHG | WEIGHT: 215 LBS | HEART RATE: 52 BPM | DIASTOLIC BLOOD PRESSURE: 68 MMHG | OXYGEN SATURATION: 95 % | BODY MASS INDEX: 29.16 KG/M2

## 2023-12-13 LAB
ANION GAP SERPL CALCULATED.3IONS-SCNC: 10 MMOL/L (ref 9–17)
BNP SERPL-MCNC: 235 PG/ML
BUN SERPL-MCNC: 15 MG/DL (ref 8–23)
CALCIUM SERPL-MCNC: 9.4 MG/DL (ref 8.6–10.4)
CHLORIDE SERPL-SCNC: 103 MMOL/L (ref 98–107)
CO2 SERPL-SCNC: 25 MMOL/L (ref 20–31)
CREAT SERPL-MCNC: 1 MG/DL (ref 0.7–1.2)
GFR SERPL CREATININE-BSD FRML MDRD: >60 ML/MIN/1.73M2
GLUCOSE SERPL-MCNC: 100 MG/DL (ref 70–99)
POTASSIUM SERPL-SCNC: 4.4 MMOL/L (ref 3.7–5.3)
SODIUM SERPL-SCNC: 138 MMOL/L (ref 135–144)

## 2023-12-13 PROCEDURE — 80048 BASIC METABOLIC PNL TOTAL CA: CPT

## 2023-12-13 PROCEDURE — 36415 COLL VENOUS BLD VENIPUNCTURE: CPT

## 2023-12-13 PROCEDURE — 99213 OFFICE O/P EST LOW 20 MIN: CPT

## 2023-12-13 PROCEDURE — 83880 ASSAY OF NATRIURETIC PEPTIDE: CPT

## 2023-12-13 RX ORDER — LISINOPRIL 2.5 MG/1
2.5 TABLET ORAL DAILY
Qty: 90 TABLET | Refills: 1 | Status: SHIPPED | OUTPATIENT
Start: 2023-12-13

## 2023-12-13 RX ORDER — SPIRONOLACTONE 25 MG/1
25 TABLET ORAL DAILY
Qty: 90 TABLET | Refills: 1 | Status: SHIPPED | OUTPATIENT
Start: 2023-12-13

## 2023-12-13 NOTE — PROGRESS NOTES
[facility]  Medication Management  Pharmacist  Heart Failure    [address], [address]  Phone: [phone]  Fax: [phone]      NAME: Saeed Villarreal  MEDICAL RECORD NUMBER:  [id number]  AGE: 77 y.o. GENDER: male  : 1955  EPISODE DATE:  2023      Hope Carrillo was referred to the [facility] Medication Management Service by Dr. Nahomi Houston for heart failure services. Special Instructions per the Referral Include: Patient Education and Medication Management    Dry weight: 214.5 lb pounds     ECHO EF%: 25-30 Date: 23         This visit was performed as:  THIS VISIT WAS PERFORMED AS: An in person visit. Protocols were followed with precautions to reduce the spread of COVID-19. Subjective   Hope Carrillo is a 77 y.o. male here for the Heart Failure Services. They are here today for a comprehensive medication review including over the counter medications and herbal products, overall wellbeing assessment, transition of care, extensive education and any needed adjustments with updates and recommendations communicated to the referring physician. New York Heart Association Classification based on patient symptoms:  Class I: No limitation of physical activity. Ordinary physical activity does not cause undue fatigue, palpitation, dyspnea (shortness of breath). Shortness of Breath:   Denies shortness of breath    Other Heart Failure Findings:   Denies cough at night or when lying down  Denies lower extremity edema  Denies fluid weight gain  Denies unusual fatigue  Denies early saiety or abdominal fullness    General Findings:  No other general findings noted  Sleeps with one pillow    Denies any dizziness, faint feeling, lightheadedness     Comments:    Patient reports on home scale 209-211. States weight was 210 lb this morning. Pt states his weight was up 1 lb around . Had smoked turkey, gravy, potatoes. Tried to watch salt.  Normally

## 2023-12-13 NOTE — DISCHARGE INSTRUCTIONS
Continue these heart failure medications:  Continue Toprol XL 25 mg daily  Continue Lisinopril 2.5 mg daily  Continue Jardiance 10 mg daily  Continue Spironolactone 25 mg daily    - Daily weights  Call with weight gain of 3 pounds per day or 5 pounds per week  - Call right away with signs and symptoms of heart failure

## 2023-12-13 NOTE — TELEPHONE ENCOUNTER
Patient called to inquire if K2 supplement would be recommended for him to take to help \"clear his arteries\". Explained that there is not any good evidence of this and unless his provider told him to take this product I would not recommend he start it. Jordan Andersen [facility],Pharm. D,, [facility], [facility]  12/13/2023  4:29 PM

## 2023-12-13 NOTE — TELEPHONE ENCOUNTER
Prescription refills sent to [address] [facility] for spironolactone 25 mg, lisinopril 2.5 mg, and Jardiance 10 mg  Alison Salinas, Pharm D, [address] Medication Management Clinic  12/13/2023 8:23 AM

## 2024-01-24 ENCOUNTER — HOSPITAL ENCOUNTER (OUTPATIENT)
Age: 69
Discharge: HOME | End: 2024-01-24
Payer: MEDICARE

## 2024-01-24 LAB
CHOLEST SERPL-MCNC: 127 MG/DL
CHOLESTEROL/HDL RATIO: 2.8
HDLC SERPL-MCNC: 45 MG/DL
LDLC SERPL CALC-MCNC: 69 MG/DL (ref 0–130)
TRIGL SERPL-MCNC: 65 MG/DL

## 2024-01-24 PROCEDURE — 80061 LIPID PANEL: CPT

## 2024-01-24 PROCEDURE — 36415 COLL VENOUS BLD VENIPUNCTURE: CPT

## 2024-01-29 ENCOUNTER — HOSPITAL ENCOUNTER (OUTPATIENT)
Dept: OTHER | Age: 69
Discharge: HOME | End: 2024-01-29
Payer: MEDICARE

## 2024-01-29 ENCOUNTER — HOSPITAL ENCOUNTER (OUTPATIENT)
Age: 69
Setting detail: SPECIMEN
Discharge: HOME | End: 2024-01-29
Payer: MEDICARE

## 2024-01-29 VITALS
BODY MASS INDEX: 29.36 KG/M2 | DIASTOLIC BLOOD PRESSURE: 62 MMHG | HEIGHT: 72 IN | RESPIRATION RATE: 16 BRPM | SYSTOLIC BLOOD PRESSURE: 100 MMHG | OXYGEN SATURATION: 97 % | HEART RATE: 53 BPM | WEIGHT: 216.8 LBS

## 2024-01-29 LAB
ANION GAP SERPL CALCULATED.3IONS-SCNC: 12 MMOL/L (ref 9–17)
BNP SERPL-MCNC: 296 PG/ML
BUN SERPL-MCNC: 17 MG/DL (ref 8–23)
CHLORIDE SERPL-SCNC: 102 MMOL/L (ref 98–107)
CO2 SERPL-SCNC: 23 MMOL/L (ref 20–31)
CREAT SERPL-MCNC: 1 MG/DL (ref 0.7–1.2)
GFR SERPL CREATININE-BSD FRML MDRD: >60 ML/MIN/1.73M2
POTASSIUM SERPL-SCNC: 4.4 MMOL/L (ref 3.7–5.3)
SODIUM SERPL-SCNC: 137 MMOL/L (ref 135–144)

## 2024-01-29 PROCEDURE — 99211 OFF/OP EST MAY X REQ PHY/QHP: CPT

## 2024-01-29 PROCEDURE — 80051 ELECTROLYTE PANEL: CPT

## 2024-01-29 PROCEDURE — 82565 ASSAY OF CREATININE: CPT

## 2024-01-29 PROCEDURE — 83880 ASSAY OF NATRIURETIC PEPTIDE: CPT

## 2024-01-29 PROCEDURE — 36415 COLL VENOUS BLD VENIPUNCTURE: CPT

## 2024-01-29 PROCEDURE — 84520 ASSAY OF UREA NITROGEN: CPT

## 2024-01-29 NOTE — PROGRESS NOTES
[facility]  Heart Failure Clinic      [address]  [place] [address]  Phone: [phone]  Fax: [phone]      NAME: Mike Kovacs  MEDICAL RECORD NUMBER:  [id number]  AGE: 68 y.o.   GENDER: male  : 1955  EPISODE DATE:  2024      Mike Kovacs was referred to the [place] [facility] Failure [facility] by Dr. Louie for heart failure services.     ECHO EF%: 35-40% Date: 24    ECHO EF%: 30-35% Date: 23      Recent Cardiology follow-up apt: 24  Follow-up apt recommended: 6 months  Upcoming testing: n/a  Medication Management: yes  Nephrologist: n/a     Mike Kovacs is a 68 y.o. male here for the Heart Failure Services.  He is here today for a Heart Failure assessment/consultation, monitoring medication effectiveness, reviewing necessary labs, transition of care, extensive Heart Failure education, reporting any concerns or symptoms and obtaining any necessary medication adjustments or orders from the patients health care team.    Vital Signs:   /62   Pulse 53   Resp 16   Ht 1.829 m (6')   Wt 98.3 kg (216 lb 12.8 oz)   SpO2 97%   BMI 29.40 kg/m²    O2 Device: None (Room air)        Weight loss/gain +3.8lbs      Nursing Assessment:    Respiratory:    Assessment  Charting Type: Reassessment    Breath Sounds  Right Upper Lobe: Clear  Right Middle Lobe: Clear  Right Lower Lobe: Clear  Left Upper Lobe: Clear  Left Lower Lobe: Clear    Cough/Sputum  Cough: None    Cardiac  Cardiac Regularity: Regular  Heart Sounds: S1, S2, Distant/Muffled    Peripheral Vascular  Peripheral Vascular (WDL): Within Defined Limits  Edema: None      Subjective data:    Shortness of Breath:   Denies shortness of breath    Other Heart Failure Findings:   Denies cough at night or when lying down  Denies lower extremity edema  Denies fluid weight gain  Denies unusual fatigue  Denies early saiety or abdominal fullness    General Findings:  No other general findings

## 2024-02-14 ENCOUNTER — TELEPHONE (OUTPATIENT)
Dept: PHARMACY | Age: 69
End: 2024-02-14

## 2024-02-14 ENCOUNTER — HOSPITAL ENCOUNTER (OUTPATIENT)
Dept: PHARMACY | Age: 69
Setting detail: THERAPIES SERIES
Discharge: HOME | End: 2024-02-14
Payer: MEDICARE

## 2024-02-14 VITALS
SYSTOLIC BLOOD PRESSURE: 108 MMHG | OXYGEN SATURATION: 95 % | DIASTOLIC BLOOD PRESSURE: 67 MMHG | BODY MASS INDEX: 29.36 KG/M2 | HEART RATE: 53 BPM | WEIGHT: 216.5 LBS

## 2024-02-14 PROCEDURE — 99212 OFFICE O/P EST SF 10 MIN: CPT

## 2024-02-14 RX ORDER — PRAVASTATIN SODIUM 80 MG/1
80 TABLET ORAL DAILY
COMMUNITY

## 2024-02-14 NOTE — DISCHARGE INSTRUCTIONS
Continue these heart failure medications:  Continue Toprol XL 25 mg daily  Continue Lisinopril 2.5 mg daily  Continue Jardiance 10 mg daily  Continue Spironolactone 25 mg daily    Increase physical activity to walking on treadmill for 1.5 miles twice a day.   Call insurance company to check on Complete[employer] 24/25 coverage and call Clinic [phone] to update.     - Daily weights  Call with weight gain of 3 pounds per day or 5 pounds per week  - Call right away with signs and symptoms of heart failure

## 2024-02-14 NOTE — PROGRESS NOTES
[facility]  Medication Management  Pharmacist  Heart Failure    [address]  [place], [place] [address]  Phone: [phone]  Fax: [phone]      NAME: Mike Kovacs  MEDICAL RECORD NUMBER:  [id number]  AGE: 68 y.o.   GENDER: male  : 1955  EPISODE DATE:  2024      Mike Kovacs was referred to the [place] [facility] by Dr. HOMER Louie for heart failure services.  Special Instructions per the Referral Include: Patient Education and Medication Management    Dry weight: 216.5 lb pounds     ECHO EF%: 35-40 Date: 24   ECHO EF%: 25-30 Date: 23         This visit was performed as:  THIS VISIT WAS PERFORMED AS: An in person visit. Protocols were followed with precautions to reduce the spread of COVID-19.     Subjective   Mike Kovacs is a 68 y.o. male here for the Heart Failure Services.    They are here today for a comprehensive medication review including over the counter medications and herbal products, overall wellbeing assessment, transition of care, extensive education and any needed adjustments with updates and recommendations communicated to the referring physician.      New York Heart Association Classification based on patient symptoms:  Class I: No limitation of physical activity.  Ordinary physical activity does not cause undue fatigue, palpitation, dyspnea (shortness of breath).    Shortness of Breath:   Denies shortness of breath    Other Heart Failure Findings:   Denies cough at night or when lying down  Denies lower extremity edema  Denies fluid weight gain  Denies unusual fatigue  Denies early saiety or abdominal fullness    General Findings:  No other general findings noted  Sleeps with one pillow    Denies any dizziness, faint feeling, only lightheadedness after workouts    Comments:    Patient reports on home scale 210-215 lb at home in the morning. States weight was 213.5 lb this morning without shoes. Reports weight gain due to

## 2024-02-29 ENCOUNTER — HOSPITAL ENCOUNTER (OUTPATIENT)
Age: 69
Setting detail: SPECIMEN
Discharge: HOME | End: 2024-02-29
Payer: MEDICARE

## 2024-02-29 ENCOUNTER — HOSPITAL ENCOUNTER (OUTPATIENT)
Dept: OTHER | Age: 69
Discharge: HOME | End: 2024-02-29
Payer: MEDICARE

## 2024-02-29 VITALS
BODY MASS INDEX: 29.39 KG/M2 | DIASTOLIC BLOOD PRESSURE: 66 MMHG | HEIGHT: 72 IN | SYSTOLIC BLOOD PRESSURE: 108 MMHG | HEART RATE: 49 BPM | WEIGHT: 217 LBS | RESPIRATION RATE: 18 BRPM | OXYGEN SATURATION: 97 %

## 2024-02-29 LAB
ANION GAP SERPL CALCULATED.3IONS-SCNC: 11 MMOL/L (ref 9–17)
BNP SERPL-MCNC: 252 PG/ML
BUN SERPL-MCNC: 21 MG/DL (ref 8–23)
CHLORIDE SERPL-SCNC: 102 MMOL/L (ref 98–107)
CO2 SERPL-SCNC: 24 MMOL/L (ref 20–31)
CREAT SERPL-MCNC: 1 MG/DL (ref 0.7–1.2)
GFR SERPL CREATININE-BSD FRML MDRD: >60 ML/MIN/1.73M2
POTASSIUM SERPL-SCNC: 4.7 MMOL/L (ref 3.7–5.3)
SODIUM SERPL-SCNC: 137 MMOL/L (ref 135–144)

## 2024-02-29 PROCEDURE — 82565 ASSAY OF CREATININE: CPT

## 2024-02-29 PROCEDURE — 84520 ASSAY OF UREA NITROGEN: CPT

## 2024-02-29 PROCEDURE — 99211 OFF/OP EST MAY X REQ PHY/QHP: CPT

## 2024-02-29 PROCEDURE — 36415 COLL VENOUS BLD VENIPUNCTURE: CPT

## 2024-02-29 PROCEDURE — 83880 ASSAY OF NATRIURETIC PEPTIDE: CPT

## 2024-02-29 PROCEDURE — 80051 ELECTROLYTE PANEL: CPT

## 2024-02-29 NOTE — PROGRESS NOTES
other general findings noted      Comments:    - pt returns to heart failure clinic for reassessment. Pt reports he is feeling well, denies any CP/Pressure/tightness or SOB. Pt does report feeling occasional palpitations at rest, describes as \"a fluttering in my chest.\" Pt continues to walk 1-2 miles on his treadmill 3-5 times a week, \"sometimes more if I'm not doing anything.\" Pt has been staying active doing some home remodeling. Pt's weight is essentially unchanged from last visit, no visible signs of fluid retention. Per pt \"my wife does all the cooking and she cooks mostly from a box. She's done that for a long time so that's not going to change.\" Pt admits that he eats out \"quite a bit\" so he is unsure of daily sodium intake. Pt is following fluid restrictions of 64 ounces/day. Encouraged and reinforced to pt importance of limiting sodium to 1500 mg or less a day, pt v/u.     Labs reviewed with pt ZST=991 down from 296; BUN/Creat=21/1.0      Patient Active Problem List   Diagnosis Code    NSTEMI (non-ST elevated myocardial infarction) ([facility]) I21.4    Chronic GERD K21.9    Dyslipidemia E78.5    Hyponatremia E87.1    Acute cystitis N30.00     Social History     Tobacco Use    Smoking status: Former     Current packs/day: 0.00     Average packs/day: 1 pack/day for 40.0 years (40.0 ttl pk-yrs)     Types: Cigarettes     Start date: 1976     Quit date: 2016     Years since quittin.1    Smokeless tobacco: Never    Tobacco comments:     updated per Pt   Substance Use Topics    Alcohol use: Not Currently     Comment: rarely/just on special occasions       HPI: No diagnosis found.      BMP:   Lab Results   Component Value Date/Time     2024 08:01 AM    K 4.7 2024 08:01 AM     2024 08:01 AM    CO2 24 2024 08:01 AM    BUN 21 2024 08:01 AM    CREATININE 1.0 2024 08:01 AM     Lab Results   Component Value Date/Time    K 4.7 2024 08:01 AM    K 4.4 2024

## 2024-04-29 ENCOUNTER — TELEPHONE (OUTPATIENT)
Dept: PHARMACY | Age: 69
End: 2024-04-29

## 2024-04-29 NOTE — TELEPHONE ENCOUNTER
Patient called to reschedule appointment for Heart Failure in [facility] Medication Management clinic. Patient's father has an appt at the same time and he needs to take him to the appt. Appt rescheduled 5/14/24  Chanelle Soria, Pharm D, BCPS, CACP  [facility] Medication Management [facility]  4/29/2024 3:26 PM

## 2024-05-09 ENCOUNTER — TELEPHONE (OUTPATIENT)
Dept: PHARMACY | Age: 69
End: 2024-05-09

## 2024-05-09 RX ORDER — METOPROLOL SUCCINATE 25 MG/1
25 TABLET, EXTENDED RELEASE ORAL DAILY
Qty: 90 TABLET | Refills: 1 | Status: SHIPPED | OUTPATIENT
Start: 2024-05-09

## 2024-05-09 NOTE — TELEPHONE ENCOUNTER
Refill of metoprolol succinate 25mg tablets #90 with 1 refill sent to [facility] on [address].  Jordan Andersen RPH,Pharm.D,, BCPS, CACP  5/9/2024  3:24 PM

## 2024-05-14 ENCOUNTER — HOSPITAL ENCOUNTER (OUTPATIENT)
Age: 69
Setting detail: SPECIMEN
Discharge: HOME | End: 2024-05-14
Payer: MEDICARE

## 2024-05-14 ENCOUNTER — HOSPITAL ENCOUNTER (OUTPATIENT)
Dept: PHARMACY | Age: 69
Setting detail: THERAPIES SERIES
Discharge: HOME | End: 2024-05-14
Payer: MEDICARE

## 2024-05-14 VITALS
HEART RATE: 50 BPM | SYSTOLIC BLOOD PRESSURE: 106 MMHG | BODY MASS INDEX: 29.96 KG/M2 | DIASTOLIC BLOOD PRESSURE: 71 MMHG | WEIGHT: 220.9 LBS | OXYGEN SATURATION: 96 %

## 2024-05-14 LAB
ANION GAP SERPL CALCULATED.3IONS-SCNC: 10 MMOL/L (ref 9–17)
BNP SERPL-MCNC: 296 PG/ML
BUN SERPL-MCNC: 20 MG/DL (ref 8–23)
CALCIUM SERPL-MCNC: 9.7 MG/DL (ref 8.6–10.4)
CHLORIDE SERPL-SCNC: 100 MMOL/L (ref 98–107)
CO2 SERPL-SCNC: 24 MMOL/L (ref 20–31)
CREAT SERPL-MCNC: 1.1 MG/DL (ref 0.7–1.2)
GFR, ESTIMATED: 72 ML/MIN/1.73M2
GLUCOSE SERPL-MCNC: 86 MG/DL (ref 70–99)
POTASSIUM SERPL-SCNC: 4.8 MMOL/L (ref 3.7–5.3)
SODIUM SERPL-SCNC: 134 MMOL/L (ref 135–144)

## 2024-05-14 PROCEDURE — 36415 COLL VENOUS BLD VENIPUNCTURE: CPT

## 2024-05-14 PROCEDURE — 83880 ASSAY OF NATRIURETIC PEPTIDE: CPT

## 2024-05-14 PROCEDURE — 80048 BASIC METABOLIC PNL TOTAL CA: CPT

## 2024-05-14 PROCEDURE — 99212 OFFICE O/P EST SF 10 MIN: CPT

## 2024-05-14 NOTE — PROGRESS NOTES
[facility]  Medication Management  Pharmacist  Heart Failure    [address]  [place], [place] [address]  Phone: [phone]  Fax: [phone]      NAME: Mike Kovacs  MEDICAL RECORD NUMBER:  [id number]  AGE: 68 y.o.   GENDER: male  : 1955  EPISODE DATE:  2024      Mike Kovacs was referred to the [place] [facility] by Dr. HOMER Louie for heart failure services.  Special Instructions per the Referral Include: Patient Education and Medication Management    Dry weight: 220.9 lb pounds     ECHO EF%: 35-40 Date: 24   ECHO EF%: 25-30 Date: 23         This visit was performed as:  THIS VISIT WAS PERFORMED AS: An in person visit. Protocols were followed with precautions to reduce the spread of COVID-19.     Subjective   Mike Kovacs is a 68 y.o. male here for the Heart Failure Services.    They are here today for a comprehensive medication review including over the counter medications and herbal products, overall wellbeing assessment, transition of care, extensive education and any needed adjustments with updates and recommendations communicated to the referring physician.      New York Heart Association Classification based on patient symptoms:  Class I: No limitation of physical activity.  Ordinary physical activity does not cause undue fatigue, palpitation, dyspnea (shortness of breath).    Shortness of Breath:   Denies shortness of breath    Other Heart Failure Findings:   Denies cough at night or when lying down  Denies lower extremity edema  Denies fluid weight gain  Denies unusual fatigue  Denies early saiety or abdominal fullness    General Findings:  No other general findings noted  Sleeps with one pillow    Denies any dizziness, faint feeling, only lightheadedness after workouts    Comments:    Labs Today    Potassium 4.8  Creatinine 1.1  Feels best he has in years  Exercise:  Treadmill  One mile daily  Proably 2 miles daily

## 2024-05-14 NOTE — DISCHARGE INSTRUCTIONS
Continue :  Toprol XL 25 mg daily  Lisinopril 2.5 mg daily  Jardiance 10 mg daily  Spironolactone 25 mg daily  Check Weight Daily  Notify our clinic of weight gain of 3 pounds in one week or 5 pounds in one week

## 2024-05-15 ENCOUNTER — TELEPHONE (OUTPATIENT)
Dept: OTHER | Age: 69
End: 2024-05-15

## 2024-05-15 NOTE — TELEPHONE ENCOUNTER
Writer calls pt to remind him of CHF clinic appt tomorrow. Message left for pt with instructions to check in with lab, return call requested if pt is unable to keep or make appt.

## 2024-05-16 ENCOUNTER — HOSPITAL ENCOUNTER (OUTPATIENT)
Age: 69
Setting detail: SPECIMEN
Discharge: HOME | End: 2024-05-16

## 2024-05-16 ENCOUNTER — HOSPITAL ENCOUNTER (OUTPATIENT)
Dept: OTHER | Age: 69
Discharge: HOME | End: 2024-05-16
Payer: MEDICARE

## 2024-05-16 VITALS
WEIGHT: 221.8 LBS | HEIGHT: 72 IN | OXYGEN SATURATION: 96 % | BODY MASS INDEX: 30.04 KG/M2 | HEART RATE: 48 BPM | DIASTOLIC BLOOD PRESSURE: 76 MMHG | SYSTOLIC BLOOD PRESSURE: 120 MMHG | RESPIRATION RATE: 18 BRPM

## 2024-05-16 PROCEDURE — 99211 OFF/OP EST MAY X REQ PHY/QHP: CPT

## 2024-05-16 ASSESSMENT — EJECTION FRACTION
EF_SOURCE: 2D ECHO
EF_VALUE: 25-30
EF_SOURCE: 2D ECHO
EF_VALUE: 35-40

## 2024-05-16 NOTE — PROGRESS NOTES
[facility]  Heart Failure Clinic      [address]  [place] [address]  Phone: [phone]  Fax: [phone]      NAME: Mike Kovacs  MEDICAL RECORD NUMBER:  [id number]  AGE: 68 y.o.   GENDER: male  : 1955  EPISODE DATE:  2024      Mike Kovacs was referred to the [employer] [facility] by Dr. Louie for heart failure services.     ECHO EF%: 35-40 Date: 2024    ECHO EF%: 25-30 Date: 2023      Recent Cardiology follow-up apt: 24  Follow-up apt recommended: 6 months  Upcoming testing: none  Medication Management: yes  Nephrologist: n/a     Mike Kovacs is a 68 y.o. male here for the Heart Failure Services.  He is here today for a Heart Failure assessment/consultation, monitoring medication effectiveness, reviewing necessary labs, transition of care, extensive Heart Failure education, reporting any concerns or symptoms and obtaining any necessary medication adjustments or orders from the patients health care team.    Vital Signs:   /76   Pulse (!) 48   Resp 18   Ht 1.829 m (6')   Wt 100.6 kg (221 lb 12.8 oz)   SpO2 96%   BMI 30.08 kg/m²    O2 Device: None (Room air)        Weight loss/gain +4 lbs   Pt's weight at home this morning was 218 lb      Nursing Assessment:    Respiratory:    Assessment  Charting Type: Reassessment    Breath Sounds  Respiratory Pattern: Regular  Right Upper Lobe: Clear  Right Middle Lobe: Clear  Right Lower Lobe: Clear  Left Upper Lobe: Clear  Left Lower Lobe: Clear    Cough/Sputum  Cough: None    Cardiac  Cardiac Regularity: Regular  Heart Sounds: S1, S2, Distant/Muffled    Peripheral Vascular  Peripheral Vascular (WDL): Within Defined Limits  Edema: None      Subjective data:    Shortness of Breath:   Denies shortness of breath  Denies all other shortness of breath    Other Heart Failure Findings:   Denies cough at night or when lying down  Denies lower extremity edema  Denies fluid weight gain  Denies

## 2024-05-23 ENCOUNTER — HOSPITAL ENCOUNTER (EMERGENCY)
Age: 69
Discharge: HOME | End: 2024-05-23
Attending: EMERGENCY MEDICINE
Payer: MEDICARE

## 2024-05-23 VITALS
HEIGHT: 73 IN | DIASTOLIC BLOOD PRESSURE: 61 MMHG | RESPIRATION RATE: 21 BRPM | SYSTOLIC BLOOD PRESSURE: 101 MMHG | OXYGEN SATURATION: 97 % | TEMPERATURE: 98.9 F | WEIGHT: 219 LBS | BODY MASS INDEX: 29.03 KG/M2 | HEART RATE: 80 BPM

## 2024-05-23 DIAGNOSIS — R11.2 NAUSEA AND VOMITING, UNSPECIFIED VOMITING TYPE: Primary | ICD-10-CM

## 2024-05-23 LAB
ALBUMIN SERPL-MCNC: 4.3 G/DL (ref 3.5–5.2)
ALP SERPL-CCNC: 57 U/L (ref 40–129)
ALT SERPL-CCNC: 17 U/L (ref 5–41)
ANION GAP SERPL CALCULATED.3IONS-SCNC: 16 MMOL/L (ref 9–17)
AST SERPL-CCNC: 17 U/L
BASOPHILS # BLD: 0 K/UL (ref 0–0.2)
BASOPHILS NFR BLD: 0 % (ref 0–2)
BILIRUB SERPL-MCNC: 0.5 MG/DL (ref 0.3–1.2)
BILIRUB UR QL STRIP: NEGATIVE
BUN SERPL-MCNC: 25 MG/DL (ref 8–23)
CALCIUM SERPL-MCNC: 9.2 MG/DL (ref 8.6–10.4)
CHLORIDE SERPL-SCNC: 102 MMOL/L (ref 98–107)
CLARITY UR: CLEAR
CO2 SERPL-SCNC: 18 MMOL/L (ref 20–31)
COLOR UR: YELLOW
COMMENT: ABNORMAL
CREAT SERPL-MCNC: 1 MG/DL (ref 0.7–1.2)
EKG ATRIAL RATE: 81 BPM
EKG P AXIS: 51 DEGREES
EKG P-R INTERVAL: 188 MS
EKG Q-T INTERVAL: 364 MS
EKG QRS DURATION: 106 MS
EKG QTC CALCULATION (BAZETT): 422 MS
EKG R AXIS: -39 DEGREES
EKG T AXIS: 70 DEGREES
EKG VENTRICULAR RATE: 81 BPM
EOSINOPHIL # BLD: 0 K/UL (ref 0–0.4)
EOSINOPHILS RELATIVE PERCENT: 1 % (ref 0–4)
ERYTHROCYTE [DISTWIDTH] IN BLOOD BY AUTOMATED COUNT: 13.8 % (ref 11.5–14.9)
GFR, ESTIMATED: 81 ML/MIN/1.73M2
GLUCOSE SERPL-MCNC: 113 MG/DL (ref 70–99)
GLUCOSE UR STRIP-MCNC: ABNORMAL MG/DL
HCT VFR BLD AUTO: 43.2 % (ref 41–53)
HGB BLD-MCNC: 14 G/DL (ref 13.5–17.5)
HGB UR QL STRIP.AUTO: NEGATIVE
KETONES UR STRIP-MCNC: NEGATIVE MG/DL
LEUKOCYTE ESTERASE UR QL STRIP: NEGATIVE
LIPASE SERPL-CCNC: 32 U/L (ref 13–60)
LYMPHOCYTES NFR BLD: 0.4 K/UL (ref 1–4.8)
LYMPHOCYTES RELATIVE PERCENT: 8 % (ref 24–44)
MAGNESIUM SERPL-MCNC: 1.7 MG/DL (ref 1.6–2.6)
MCH RBC QN AUTO: 27.5 PG (ref 26–34)
MCHC RBC AUTO-ENTMCNC: 32.3 G/DL (ref 31–37)
MCV RBC AUTO: 85.2 FL (ref 80–100)
MONOCYTES NFR BLD: 0.1 K/UL (ref 0.1–1.3)
MONOCYTES NFR BLD: 2 % (ref 1–7)
NEUTROPHILS NFR BLD: 89 % (ref 36–66)
NEUTS SEG NFR BLD: 4.9 K/UL (ref 1.3–9.1)
NITRITE UR QL STRIP: NEGATIVE
PH UR STRIP: 5 [PH] (ref 5–8)
PLATELET # BLD AUTO: 163 K/UL (ref 150–450)
PMV BLD AUTO: 8.1 FL (ref 6–12)
POTASSIUM SERPL-SCNC: 4.4 MMOL/L (ref 3.7–5.3)
PROT SERPL-MCNC: 7 G/DL (ref 6.4–8.3)
PROT UR STRIP-MCNC: NEGATIVE MG/DL
RBC # BLD AUTO: 5.07 M/UL (ref 4.5–5.9)
SODIUM SERPL-SCNC: 136 MMOL/L (ref 135–144)
SP GR UR STRIP: 1.02 (ref 1–1.03)
TROPONIN I SERPL HS-MCNC: 12 NG/L (ref 0–22)
TROPONIN I SERPL HS-MCNC: 16 NG/L (ref 0–22)
UROBILINOGEN UR STRIP-ACNC: NORMAL EU/DL (ref 0–1)
WBC OTHER # BLD: 5.5 K/UL (ref 3.5–11)

## 2024-05-23 PROCEDURE — 2580000003 HC RX 258: Performed by: EMERGENCY MEDICINE

## 2024-05-23 PROCEDURE — 83690 ASSAY OF LIPASE: CPT

## 2024-05-23 PROCEDURE — 36415 COLL VENOUS BLD VENIPUNCTURE: CPT

## 2024-05-23 PROCEDURE — 80053 COMPREHEN METABOLIC PANEL: CPT

## 2024-05-23 PROCEDURE — 99284 EMERGENCY DEPT VISIT MOD MDM: CPT

## 2024-05-23 PROCEDURE — 81003 URINALYSIS AUTO W/O SCOPE: CPT

## 2024-05-23 PROCEDURE — 84484 ASSAY OF TROPONIN QUANT: CPT

## 2024-05-23 PROCEDURE — 85025 COMPLETE CBC W/AUTO DIFF WBC: CPT

## 2024-05-23 PROCEDURE — 83735 ASSAY OF MAGNESIUM: CPT

## 2024-05-23 RX ORDER — 0.9 % SODIUM CHLORIDE 0.9 %
1000 INTRAVENOUS SOLUTION INTRAVENOUS ONCE
Status: COMPLETED | OUTPATIENT
Start: 2024-05-23 | End: 2024-05-23

## 2024-05-23 RX ADMIN — SODIUM CHLORIDE 1000 ML: 9 INJECTION, SOLUTION INTRAVENOUS at 05:06

## 2024-05-23 ASSESSMENT — ENCOUNTER SYMPTOMS
ABDOMINAL PAIN: 0
VOMITING: 1
SHORTNESS OF BREATH: 0
COLOR CHANGE: 0
EYE PAIN: 0
NAUSEA: 1
BACK PAIN: 0

## 2024-05-23 ASSESSMENT — PAIN - FUNCTIONAL ASSESSMENT: PAIN_FUNCTIONAL_ASSESSMENT: NONE - DENIES PAIN

## 2024-05-23 NOTE — ED PROVIDER NOTES
EMERGENCY DEPARTMENT ENCOUNTER    Pt Name: Mike Kovacs  MRN: [id number]  Birthdate 1955  Date of evaluation: 5/23/24  CHIEF COMPLAINT       Chief Complaint   Patient presents with    Illness     Pt woken from sleep with chills and nausea. Emesis x1 for EMS, 4 mg zofran given en route     HISTORY OF PRESENT ILLNESS   68-year-old male presents with complaints of nausea vomiting and chills.  Patient reports that he woke up tonight was feeling nauseous, had the chills and had episode of vomiting.  Was given meds in by EMS he reports he is now feeling much better.  He denies any chest pain denies any shortness of breath denies any abdominal pain fevers or chills.  He denies any new numbness tingling or weakness to extremities.  He reports he thinks he ate something bad last night, he denies any known sick contacts.    The history is provided by the patient.           REVIEW OF SYSTEMS     Review of Systems   Constitutional:  Positive for chills. Negative for fever.   HENT:  Negative for congestion and ear pain.    Eyes:  Negative for pain.   Respiratory:  Negative for shortness of breath.    Cardiovascular:  Negative for chest pain, palpitations and leg swelling.   Gastrointestinal:  Positive for nausea and vomiting. Negative for abdominal pain.   Genitourinary:  Negative for dysuria and flank pain.   Musculoskeletal:  Negative for back pain.   Skin:  Negative for color change.   Neurological:  Negative for numbness and headaches.   Psychiatric/Behavioral:  Negative for confusion.    All other systems reviewed and are negative.    PASTMEDICAL HISTORY     Past Medical History:   Diagnosis Date    CAD (coronary artery disease)     CHF (congestive heart failure) ([facility])     Hyperlipidemia     NSTEMI (non-ST elevation myocardial infarction) ([facility]) 04/23/2023     Past Problem List  Patient Active Problem List   Diagnosis Code    NSTEMI (non-ST elevated myocardial infarction) ([facility]) I21.4    Chronic GERD K21.9

## 2024-05-24 ENCOUNTER — HOSPITAL ENCOUNTER (EMERGENCY)
Age: 69
Discharge: HOME | End: 2024-05-24
Attending: EMERGENCY MEDICINE
Payer: MEDICARE

## 2024-05-24 ENCOUNTER — APPOINTMENT (OUTPATIENT)
Dept: GENERAL RADIOLOGY | Age: 69
End: 2024-05-24
Payer: MEDICARE

## 2024-05-24 VITALS
WEIGHT: 220 LBS | OXYGEN SATURATION: 92 % | HEART RATE: 67 BPM | RESPIRATION RATE: 25 BRPM | HEIGHT: 73 IN | SYSTOLIC BLOOD PRESSURE: 110 MMHG | BODY MASS INDEX: 29.16 KG/M2 | DIASTOLIC BLOOD PRESSURE: 66 MMHG | TEMPERATURE: 97.6 F

## 2024-05-24 DIAGNOSIS — R42 LIGHTHEADEDNESS: Primary | ICD-10-CM

## 2024-05-24 LAB
ALBUMIN SERPL-MCNC: 4.3 G/DL (ref 3.5–5.2)
ALBUMIN/GLOB SERPL: 2 {RATIO} (ref 1–2.5)
ALP SERPL-CCNC: 46 U/L (ref 40–129)
ALT SERPL-CCNC: 39 U/L (ref 10–50)
ANION GAP SERPL CALCULATED.3IONS-SCNC: 14 MMOL/L (ref 9–16)
AST SERPL-CCNC: 51 U/L (ref 10–50)
BACTERIA URNS QL MICRO: ABNORMAL
BASOPHILS # BLD: 0 K/UL (ref 0–0.2)
BASOPHILS NFR BLD: 0 % (ref 0–2)
BILIRUB SERPL-MCNC: 0.7 MG/DL (ref 0–1.2)
BILIRUB UR QL STRIP: NEGATIVE
BUN SERPL-MCNC: 24 MG/DL (ref 8–23)
CALCIUM SERPL-MCNC: 9 MG/DL (ref 8.6–10.4)
CASTS #/AREA URNS LPF: ABNORMAL /LPF (ref 0–8)
CHLORIDE SERPL-SCNC: 99 MMOL/L (ref 98–107)
CLARITY UR: CLEAR
CO2 SERPL-SCNC: 19 MMOL/L (ref 20–31)
COLOR UR: YELLOW
CREAT SERPL-MCNC: 1.2 MG/DL (ref 0.7–1.2)
EOSINOPHIL # BLD: 0 K/UL (ref 0–0.4)
EOSINOPHILS RELATIVE PERCENT: 0 % (ref 1–4)
EPI CELLS #/AREA URNS HPF: ABNORMAL /HPF (ref 0–5)
ERYTHROCYTE [DISTWIDTH] IN BLOOD BY AUTOMATED COUNT: 13.5 % (ref 11.8–14.4)
FLUAV AG SPEC QL: NEGATIVE
FLUBV AG SPEC QL: NEGATIVE
GFR, ESTIMATED: 66 ML/MIN/1.73M2
GLUCOSE BLD-MCNC: 109 MG/DL (ref 75–110)
GLUCOSE SERPL-MCNC: 99 MG/DL (ref 74–99)
GLUCOSE UR STRIP-MCNC: ABNORMAL MG/DL
HCT VFR BLD AUTO: 41.8 % (ref 40.7–50.3)
HGB BLD-MCNC: 13.6 G/DL (ref 13–17)
HGB UR QL STRIP.AUTO: ABNORMAL
IMM GRANULOCYTES # BLD AUTO: 0.05 K/UL (ref 0–0.3)
IMM GRANULOCYTES NFR BLD: 1 %
KETONES UR STRIP-MCNC: ABNORMAL MG/DL
LEUKOCYTE ESTERASE UR QL STRIP: NEGATIVE
LYMPHOCYTES NFR BLD: 0.25 K/UL (ref 1–4.8)
LYMPHOCYTES RELATIVE PERCENT: 5 % (ref 24–44)
MCH RBC QN AUTO: 28 PG (ref 25.2–33.5)
MCHC RBC AUTO-ENTMCNC: 32.5 G/DL (ref 28.4–34.8)
MCV RBC AUTO: 86.2 FL (ref 82.6–102.9)
MONOCYTES NFR BLD: 0.15 K/UL (ref 0.1–0.8)
MONOCYTES NFR BLD: 3 % (ref 1–7)
MORPHOLOGY: NORMAL
NEUTROPHILS NFR BLD: 91 % (ref 36–66)
NEUTS SEG NFR BLD: 4.45 K/UL (ref 1.8–7.7)
NITRITE UR QL STRIP: NEGATIVE
NRBC BLD-RTO: 0 PER 100 WBC
PH UR STRIP: 5.5 [PH] (ref 5–8)
PLATELET # BLD AUTO: ABNORMAL K/UL (ref 138–453)
PLATELET, FLUORESCENCE: 118 K/UL (ref 138–453)
PLATELETS.RETICULATED NFR BLD AUTO: 3.8 % (ref 1.1–10.3)
POTASSIUM SERPL-SCNC: 4.1 MMOL/L (ref 3.7–5.3)
PROT SERPL-MCNC: 7.1 G/DL (ref 6.6–8.7)
PROT UR STRIP-MCNC: ABNORMAL MG/DL
RBC # BLD AUTO: 4.85 M/UL (ref 4.21–5.77)
RBC #/AREA URNS HPF: ABNORMAL /HPF (ref 0–4)
SARS-COV-2 RDRP RESP QL NAA+PROBE: NOT DETECTED
SODIUM SERPL-SCNC: 132 MMOL/L (ref 136–145)
SP GR UR STRIP: 1.03 (ref 1–1.03)
SPECIMEN DESCRIPTION: NORMAL
TROPONIN I SERPL HS-MCNC: 15 NG/L (ref 0–22)
TROPONIN I SERPL HS-MCNC: 16 NG/L (ref 0–22)
UROBILINOGEN UR STRIP-ACNC: ABNORMAL EU/DL (ref 0–1)
WBC #/AREA URNS HPF: ABNORMAL /HPF (ref 0–5)
WBC OTHER # BLD: 4.9 K/UL (ref 3.5–11.3)

## 2024-05-24 PROCEDURE — 81001 URINALYSIS AUTO W/SCOPE: CPT

## 2024-05-24 PROCEDURE — 87635 SARS-COV-2 COVID-19 AMP PRB: CPT

## 2024-05-24 PROCEDURE — 87804 INFLUENZA ASSAY W/OPTIC: CPT

## 2024-05-24 PROCEDURE — 71046 X-RAY EXAM CHEST 2 VIEWS: CPT

## 2024-05-24 PROCEDURE — 85025 COMPLETE CBC W/AUTO DIFF WBC: CPT

## 2024-05-24 PROCEDURE — 96360 HYDRATION IV INFUSION INIT: CPT

## 2024-05-24 PROCEDURE — 84484 ASSAY OF TROPONIN QUANT: CPT

## 2024-05-24 PROCEDURE — 6370000000 HC RX 637 (ALT 250 FOR IP)

## 2024-05-24 PROCEDURE — 96361 HYDRATE IV INFUSION ADD-ON: CPT

## 2024-05-24 PROCEDURE — 99284 EMERGENCY DEPT VISIT MOD MDM: CPT

## 2024-05-24 PROCEDURE — 85055 RETICULATED PLATELET ASSAY: CPT

## 2024-05-24 PROCEDURE — 2580000003 HC RX 258

## 2024-05-24 PROCEDURE — 82947 ASSAY GLUCOSE BLOOD QUANT: CPT

## 2024-05-24 PROCEDURE — 80053 COMPREHEN METABOLIC PANEL: CPT

## 2024-05-24 PROCEDURE — 93005 ELECTROCARDIOGRAM TRACING: CPT | Performed by: EMERGENCY MEDICINE

## 2024-05-24 RX ORDER — MECLIZINE HCL 12.5 MG/1
12.5 TABLET ORAL 3 TIMES DAILY PRN
Qty: 15 TABLET | Refills: 0 | Status: SHIPPED | OUTPATIENT
Start: 2024-05-24 | End: 2024-05-29

## 2024-05-24 RX ORDER — 0.9 % SODIUM CHLORIDE 0.9 %
250 INTRAVENOUS SOLUTION INTRAVENOUS ONCE
Status: COMPLETED | OUTPATIENT
Start: 2024-05-24 | End: 2024-05-24

## 2024-05-24 RX ORDER — MECLIZINE HCL 12.5 MG/1
12.5 TABLET ORAL ONCE
Status: COMPLETED | OUTPATIENT
Start: 2024-05-24 | End: 2024-05-24

## 2024-05-24 RX ADMIN — MECLIZINE 12.5 MG: 12.5 TABLET ORAL at 20:05

## 2024-05-24 RX ADMIN — SODIUM CHLORIDE 250 ML: 9 INJECTION, SOLUTION INTRAVENOUS at 18:57

## 2024-05-24 RX ADMIN — SODIUM CHLORIDE 250 ML: 0.9 INJECTION, SOLUTION INTRAVENOUS at 17:42

## 2024-05-24 ASSESSMENT — ENCOUNTER SYMPTOMS
SHORTNESS OF BREATH: 0
VOMITING: 0
DIARRHEA: 0
NAUSEA: 0
BACK PAIN: 0
ABDOMINAL PAIN: 0
COUGH: 0

## 2024-05-24 ASSESSMENT — PAIN - FUNCTIONAL ASSESSMENT: PAIN_FUNCTIONAL_ASSESSMENT: NONE - DENIES PAIN

## 2024-05-24 NOTE — ED PROVIDER NOTES
[facility] ED  Emergency Department Encounter  Emergency Medicine Resident     Pt Name:Mike Kovacs  MRN: [id number]  Birthdate 1955  Date of evaluation: 24  PCP:  Sid Farley MD  Note Started: 7:17 PM EDT      CHIEF COMPLAINT       Chief Complaint   Patient presents with    Dizziness       HISTORY OF PRESENT ILLNESS  (Location/Symptom, Timing/Onset, Context/Setting, Quality, Duration, Modifying Factors, Severity.)      Mike Kovacs is a 68 y.o. male who presents with fever cough and lightheadedness X 2 days.  Patient was seen yesterday at [facility] ED after having 1 episode of emesis with chills.  Workup in the ED comprising of troponins, CBC and CMP were unremarkable.  Patient was given Zofran and IV fluids.  Patient thereafter experienced improvement in symptoms.  Today patient had an episode of fever-102 °F at home.  Patient took Tylenol after which symptoms resolved.  However patient continues to experience lightheadedness for which he wanted to be evaluated in the emergency department.  Patient denies any chest pain, shortness of breath, abdominal pain and dysuria.    PAST MEDICAL / SURGICAL / SOCIAL / FAMILY HISTORY      has a past medical history of CAD (coronary artery disease), CHF (congestive heart failure) ([facility]), Hyperlipidemia, and NSTEMI (non-ST elevation myocardial infarction) ([facility]).       has a past surgical history that includes Coronary angioplasty with stent (2023).      Social History     Socioeconomic History    Marital status:      Spouse name: Not on file    Number of children: Not on file    Years of education: Not on file    Highest education level: Not on file   Occupational History    Not on file   Tobacco Use    Smoking status: Former     Current packs/day: 0.00     Average packs/day: 1 pack/day for 40.0 years (40.0 ttl pk-yrs)     Types: Cigarettes     Start date: 1976     Quit date: 2016     Years since quittin.4

## 2024-05-24 NOTE — ED TRIAGE NOTES
Pt states that for 2 days he has had Nausea, Dizziness and some tremors.  He states that he went to [facility] yesterday and was treated and released from there with same symptoms.  He states that upon discharge all symptoms were resolved and around noon today they returned but less so.  PT is awake alert and oriented x 4.  No distress noted.

## 2024-05-24 NOTE — ED PROVIDER NOTES
[facility] ED  Emergency Department  Emergency Medicine Resident Turn-Over     Note Started: 7:04 PM EDT    Care of Mike Kovacs was assumed from Dr. Wong and is being seen for Dizziness  .  The patient's initial evaluation and plan have been discussed with the prior provider who initially evaluated the patient.     EMERGENCY DEPARTMENT COURSE / MEDICAL DECISION MAKING:       MEDICATIONS GIVEN:  Orders Placed This Encounter   Medications    sodium chloride 0.9 % bolus 250 mL    sodium chloride 0.9 % bolus 250 mL    meclizine (ANTIVERT) tablet 12.5 mg    meclizine (ANTIVERT) 12.5 MG tablet     Sig: Take 1 tablet by mouth 3 times daily as needed for Dizziness     Dispense:  15 tablet     Refill:  0       LABS / RADIOLOGY:     Labs Reviewed   CBC WITH AUTO DIFFERENTIAL - Abnormal; Notable for the following components:       Result Value    Platelet, Fluorescence 118 (*)     Immature Granulocytes % 1 (*)     Neutrophils % 91 (*)     Lymphocytes % 5 (*)     Eosinophils % 0 (*)     Lymphocytes Absolute 0.25 (*)     All other components within normal limits   COMPREHENSIVE METABOLIC PANEL - Abnormal; Notable for the following components:    Sodium 132 (*)     CO2 19 (*)     BUN 24 (*)     AST 51 (*)     All other components within normal limits   URINALYSIS WITH MICROSCOPIC - Abnormal; Notable for the following components:    Glucose, Ur 3+ (*)     Ketones, Urine SMALL (*)     Specific Gravity, UA 1.031 (*)     Urine Hgb SMALL (*)     Protein, UA 1+ (*)     All other components within normal limits   COVID-19, RAPID   RAPID INFLUENZA A/B ANTIGENS   TROPONIN   TROPONIN   POC GLUCOSE FINGERSTICK       XR CHEST (2 VW)    Result Date: 5/24/2024  EXAMINATION: TWO XRAY VIEWS OF THE CHEST 5/24/2024 6:04 pm COMPARISON: 04/23/2023 HISTORY: ORDERING SYSTEM PROVIDED HISTORY: cough TECHNOLOGIST PROVIDED HISTORY: cough Reason for Exam: cough FINDINGS: The heart normal in size.  There is no focal pulmonary  understanding and are agreeable with discharge at this time. [KR]      ED Course User Index  [KR] Francisco Romo MD       OUTSTANDING TASKS / RECOMMENDATIONS:    Follow-up troponin  Follow-up influenza and COVID swabs  Reassess/ambulate  Dispo     FINAL IMPRESSION:     1. Lightheadedness        DISPOSITION:         DISPOSITION:  [x]  Discharge   []  Transfer -    []  Admission -     []  Against Medical Advice   []  Eloped   FOLLOW-UP: Sid Farley MD  [address]  [facility] [address] [phone]    Schedule an appointment as soon as possible for a visit in 1 week  Follow-up recent ER visit     DISCHARGE MEDICATIONS: New Prescriptions    MECLIZINE (ANTIVERT) 12.5 MG TABLET    Take 1 tablet by mouth 3 times daily as needed for Dizziness           Francisco Romo MD  Emergency Medicine Resident  [facility]

## 2024-05-24 NOTE — ED NOTES
Pt to Xray via stretcher    Ongoing  3/9/2022 1008 by Ksenia Arias RN  Outcome: Ongoing  Goal: Maintain absence of muscle cramping  3/9/2022 1043 by Nguyễn Wang  Outcome: Ongoing  3/9/2022 1008 by Ksenia Arias RN  Outcome: Ongoing  Goal: Maintain normal serum potassium, sodium, calcium, phosphorus, and pH  3/9/2022 1043 by Nguyễn Wang  Outcome: Ongoing  3/9/2022 1008 by Ksenia Arias RN  Outcome: Ongoing     Problem: Loneliness or Risk for Loneliness  Goal: Demonstrate positive use of time alone when socialization is not possible  3/9/2022 1043 by Nguyễn Wang  Outcome: Ongoing  3/9/2022 1008 by Ksenia Arias RN  Outcome: Ongoing     Problem: Fatigue  Goal: Verbalize increase energy and improved vitality  3/9/2022 1043 by Nguyễn Wang  Outcome: Ongoing  3/9/2022 1008 by Ksenia Arias RN  Outcome: Ongoing     Problem: Patient Education: Go to Patient Education Activity  Goal: Patient/Family Education  3/9/2022 1043 by Nguyễn Wang  Outcome: Ongoing  3/9/2022 1008 by Ksenia Arias RN  Outcome: Ongoing     Problem: Pain:  Goal: Pain level will decrease  Description: Pain level will decrease  3/9/2022 1043 by Nguyễn Wang  Outcome: Ongoing  3/9/2022 1008 by Ksenia Arias RN  Outcome: Ongoing  Goal: Control of acute pain  Description: Control of acute pain  3/9/2022 1043 by Nguyễn Wang  Outcome: Ongoing  3/9/2022 1008 by Ksenia Arias RN  Outcome: Ongoing  Goal: Control of chronic pain  Description: Control of chronic pain  3/9/2022 1043 by Nguyễn Wang  Outcome: Ongoing  3/9/2022 1008 by Ksenia Arias RN  Outcome: Ongoing     Problem: Skin Integrity:  Goal: Will show no infection signs and symptoms  Description: Will show no infection signs and symptoms  3/9/2022 1043 by Nguyễn Wang  Outcome: Ongoing  3/9/2022 1008 by Ksenia Arias RN  Outcome: Ongoing  Goal: Absence of new skin breakdown  Description: Absence of new skin breakdown  3/9/2022 1043 by Nguyễn Wang  Outcome: Ongoing  3/9/2022 1008 by Ksenia Arias, RN  Outcome: Ongoing     Problem: Nutrition  Goal: Optimal nutrition therapy  3/9/2022 1043 by Dilan Garcias  Outcome: Ongoing  3/9/2022 1008 by Michelle Peralta RN  Outcome: Ongoing

## 2024-05-24 NOTE — ED PROVIDER NOTES
[facility]     Emergency Department     Faculty Attestation    I performed a history and physical examination of the patient and discussed management with the resident. I reviewed the resident’s note and agree with the documented findings and plan of care. Any areas of disagreement are noted on the chart. I was personally present for the key portions of any procedures. I have documented in the chart those procedures where I was not present during the key portions. I have reviewed the emergency nurses triage note. I agree with the chief complaint, past medical history, past surgical history, allergies, medications, social and family history as documented unless otherwise noted below. Documentation of the HPI, Physical Exam and Medical Decision Making performed by medical students or scribes is based on my personal performance of the HPI, PE and MDM. For Physician Assistant/ Nurse Practitioner cases/documentation I have personally evaluated this patient and have completed at least one if not all key elements of the E/M (history, physical exam, and MDM). Additional findings are as noted.    Vital signs:   Vitals:    05/24/24 1650   BP: 133/73   Pulse: 70   Resp: 16   Temp: 97.6 °F (36.4 °C)   SpO2: 96%      68-year-old male here with fever, cough, and light-headedness. Seen at another ER yesterday for vomiting which has now resolved. No diarrhea. No black or bloody stools.  Patient states that he had a fever at home today, with a Tmax of 102 °F.  Patient states that he is not having any nasal congestion, sore throat, or cough.  He does report a mild headache and lightheadedness.  No vertigo symptoms.  No neck pain or stiffness.  His vomiting has resolved, but he continues to have nausea.  No abdominal pain.  On physical exam, the patient is alert and afebrile.  Breath sounds are clear.  Cardiac exam with a normal rate, regular rhythm.  Abdomen is soft and nontender to palpation.  Bowel sounds

## 2024-05-25 LAB
EKG ATRIAL RATE: 70 BPM
EKG P AXIS: 43 DEGREES
EKG P-R INTERVAL: 196 MS
EKG Q-T INTERVAL: 394 MS
EKG QRS DURATION: 110 MS
EKG QTC CALCULATION (BAZETT): 425 MS
EKG R AXIS: -32 DEGREES
EKG T AXIS: 35 DEGREES
EKG VENTRICULAR RATE: 70 BPM

## 2024-05-25 NOTE — DISCHARGE INSTRUCTIONS
You were seen emergency department for dizziness/lightheadedness.  Your vital signs were stable.  No fever noted.  Lab work was within normal limits.  Chest x-ray showed no signs of pneumonia.    This could be due to dehydration.  Be sure to stay well-hydrated.

## 2024-06-18 ENCOUNTER — HOSPITAL ENCOUNTER (OUTPATIENT)
Dept: PHARMACY | Age: 69
Setting detail: THERAPIES SERIES
Discharge: HOME | End: 2024-06-18
Payer: MEDICARE

## 2024-06-18 ENCOUNTER — HOSPITAL ENCOUNTER (OUTPATIENT)
Age: 69
Setting detail: SPECIMEN
Discharge: HOME | End: 2024-06-18
Payer: MEDICARE

## 2024-06-18 VITALS
OXYGEN SATURATION: 96 % | BODY MASS INDEX: 28.76 KG/M2 | WEIGHT: 218 LBS | SYSTOLIC BLOOD PRESSURE: 101 MMHG | DIASTOLIC BLOOD PRESSURE: 67 MMHG | HEART RATE: 51 BPM

## 2024-06-18 LAB
ANION GAP SERPL CALCULATED.3IONS-SCNC: 12 MMOL/L (ref 9–17)
BNP SERPL-MCNC: 271 PG/ML
BUN SERPL-MCNC: 16 MG/DL (ref 8–23)
CALCIUM SERPL-MCNC: 9.4 MG/DL (ref 8.6–10.4)
CHLORIDE SERPL-SCNC: 104 MMOL/L (ref 98–107)
CO2 SERPL-SCNC: 21 MMOL/L (ref 20–31)
CREAT SERPL-MCNC: 0.9 MG/DL (ref 0.7–1.2)
GFR, ESTIMATED: >90 ML/MIN/1.73M2
GLUCOSE SERPL-MCNC: 102 MG/DL (ref 70–99)
POTASSIUM SERPL-SCNC: 4.5 MMOL/L (ref 3.7–5.3)
SODIUM SERPL-SCNC: 137 MMOL/L (ref 135–144)

## 2024-06-18 PROCEDURE — 36415 COLL VENOUS BLD VENIPUNCTURE: CPT

## 2024-06-18 PROCEDURE — 83880 ASSAY OF NATRIURETIC PEPTIDE: CPT

## 2024-06-18 PROCEDURE — 99213 OFFICE O/P EST LOW 20 MIN: CPT | Performed by: PHARMACIST

## 2024-06-18 PROCEDURE — 80048 BASIC METABOLIC PNL TOTAL CA: CPT

## 2024-06-18 NOTE — PROGRESS NOTES
[facility]  Medication Management  Pharmacist  Heart Failure    [address]  [place], [place] [address]  Phone: [phone]  Fax: [phone]      NAME: Mike Kovacs  MEDICAL RECORD NUMBER:  [id number]  AGE: 68 y.o.   GENDER: male  : 1955  EPISODE DATE:  2024      Mike Kovacs was referred to the [place] [facility] by Dr. HOMER Louie for heart failure services.  Special Instructions per the Referral Include: Patient Education and Medication Management    Dry weight: 220.9 lb pounds     ECHO EF%: 35-40 Date: 24   ECHO EF%: 25-30 Date: 23         This visit was performed as:  THIS VISIT WAS PERFORMED AS: An in person visit. Protocols were followed with precautions to reduce the spread of COVID-19.     Subjective   Mike Kovacs is a 68 y.o. male here for the Heart Failure Services.    They are here today for a comprehensive medication review including over the counter medications and herbal products, overall wellbeing assessment, transition of care, extensive education and any needed adjustments with updates and recommendations communicated to the referring physician.      New York Heart Association Classification based on patient symptoms:  Class I: No limitation of physical activity.  Ordinary physical activity does not cause undue fatigue, palpitation, dyspnea (shortness of breath).    Shortness of Breath:   Denies shortness of breath    Other Heart Failure Findings:   Denies cough at night or when lying down  Denies lower extremity edema  Denies fluid weight gain  Denies unusual fatigue  Denies early saiety or abdominal fullness    General Findings:  No other general findings noted     Comments:   Patient was in the [facility] ER on 24 for nausea and vomiting, lightheadedness, chills. States he was given fluids. EKG normal sinus rhythm. BMP on 24 unremarkable. BP and HR normal  [facility] Er on 24 with similar concerns, but was

## 2024-06-18 NOTE — DISCHARGE INSTRUCTIONS
Continue Toprol XL 25 mg daily  Continue Lisinopril 2.5 mg daily  Continue Jardiance 10 mg daily  Continue Spironolactone 25 mg daily    Call with weight gain of 3 pounds per day or 5 pounds per week

## 2024-06-19 LAB
EKG ATRIAL RATE: 81 BPM
EKG P AXIS: 51 DEGREES
EKG P-R INTERVAL: 188 MS
EKG Q-T INTERVAL: 364 MS
EKG QRS DURATION: 106 MS
EKG QTC CALCULATION (BAZETT): 422 MS
EKG R AXIS: -39 DEGREES
EKG T AXIS: 70 DEGREES
EKG VENTRICULAR RATE: 81 BPM

## 2024-07-09 ENCOUNTER — HOSPITAL ENCOUNTER (OUTPATIENT)
Age: 69
Setting detail: SPECIMEN
Discharge: HOME | End: 2024-07-09
Payer: MEDICARE

## 2024-07-09 ENCOUNTER — HOSPITAL ENCOUNTER (OUTPATIENT)
Dept: OTHER | Age: 69
Discharge: HOME | End: 2024-07-09
Payer: MEDICARE

## 2024-07-09 VITALS
HEIGHT: 73 IN | HEART RATE: 47 BPM | WEIGHT: 222.5 LBS | OXYGEN SATURATION: 97 % | BODY MASS INDEX: 29.49 KG/M2 | DIASTOLIC BLOOD PRESSURE: 70 MMHG | SYSTOLIC BLOOD PRESSURE: 116 MMHG | RESPIRATION RATE: 18 BRPM

## 2024-07-09 LAB
ANION GAP SERPL CALCULATED.3IONS-SCNC: 11 MMOL/L (ref 9–17)
BNP SERPL-MCNC: 338 PG/ML
BUN SERPL-MCNC: 15 MG/DL (ref 8–23)
CHLORIDE SERPL-SCNC: 103 MMOL/L (ref 98–107)
CO2 SERPL-SCNC: 22 MMOL/L (ref 20–31)
CREAT SERPL-MCNC: 0.8 MG/DL (ref 0.7–1.2)
GFR, ESTIMATED: >90 ML/MIN/1.73M2
POTASSIUM SERPL-SCNC: 4.6 MMOL/L (ref 3.7–5.3)
SODIUM SERPL-SCNC: 136 MMOL/L (ref 135–144)

## 2024-07-09 PROCEDURE — 83880 ASSAY OF NATRIURETIC PEPTIDE: CPT

## 2024-07-09 PROCEDURE — 36415 COLL VENOUS BLD VENIPUNCTURE: CPT

## 2024-07-09 PROCEDURE — 82565 ASSAY OF CREATININE: CPT

## 2024-07-09 PROCEDURE — 99211 OFF/OP EST MAY X REQ PHY/QHP: CPT

## 2024-07-09 PROCEDURE — 84520 ASSAY OF UREA NITROGEN: CPT

## 2024-07-09 PROCEDURE — 80051 ELECTROLYTE PANEL: CPT

## 2024-07-09 NOTE — PROGRESS NOTES
[facility]  Heart Failure Clinic      [address] [place] [address]  Phone: [phone]  Fax: [phone]      NAME: Mike Kovacs  MEDICAL RECORD NUMBER:  [id number]  AGE: 68 y.o.   GENDER: male  : 1955  EPISODE DATE:  2024      Mike Kovacs was referred to the [facility] by Dr. Louei for heart failure services.     ECHO EF%: 35-40 Date: 24    ECHO EF%: 25-30 Date: 23      Recent Cardiology follow-up apt: 24  Follow-up apt recommended: Pt has appt next week  Upcoming testing: unknown  Medication Management: yes  Nephrologist: n/a     Mike Kovacs is a 68 y.o. male here for the Heart Failure Services.  He is here today for a Heart Failure assessment/consultation, monitoring medication effectiveness, reviewing necessary labs, transition of care, extensive Heart Failure education, reporting any concerns or symptoms and obtaining any necessary medication adjustments or orders from the patients health care team.    Vital Signs:   /70   Pulse (!) 47   Resp 18   Ht 1.854 m (6' 1\")   Wt 100.9 kg (222 lb 8 oz)   SpO2 97%   BMI 29.36 kg/m²    O2 Device: None (Room air)        Weight loss/gain +1 lb      Nursing Assessment:    Respiratory:    Assessment  Charting Type: Reassessment    Breath Sounds  Respiratory Pattern: Regular  Breath Sounds Bilateral: Clear  Right Upper Lobe: Clear  Right Middle Lobe: Clear  Right Lower Lobe: Clear  Left Upper Lobe: Clear  Left Lower Lobe: Clear    Cough/Sputum  Cough: None    Cardiac  Cardiac Regularity: Regular  Heart Sounds: S1, S2, Distant/Muffled    Peripheral Vascular  Peripheral Vascular (WDL): Within Defined Limits  Edema: None      Subjective data:    Shortness of Breath:   Denies shortness of breath  Denies all other shortness of breath    Other Heart Failure Findings:   Denies cough at night or when lying down  Denies lower extremity edema  Denies fluid weight gain  Denies

## 2024-08-19 ENCOUNTER — TELEPHONE (OUTPATIENT)
Dept: OTHER | Age: 69
End: 2024-08-19

## 2024-08-20 ENCOUNTER — HOSPITAL ENCOUNTER (OUTPATIENT)
Age: 69
Setting detail: SPECIMEN
Discharge: HOME | End: 2024-08-20
Payer: MEDICARE

## 2024-08-20 ENCOUNTER — HOSPITAL ENCOUNTER (OUTPATIENT)
Dept: OTHER | Age: 69
Discharge: HOME | End: 2024-08-20
Payer: MEDICARE

## 2024-08-20 VITALS
HEART RATE: 53 BPM | DIASTOLIC BLOOD PRESSURE: 68 MMHG | SYSTOLIC BLOOD PRESSURE: 104 MMHG | OXYGEN SATURATION: 96 % | BODY MASS INDEX: 29.03 KG/M2 | WEIGHT: 219 LBS | HEIGHT: 73 IN | RESPIRATION RATE: 16 BRPM

## 2024-08-20 LAB
ANION GAP SERPL CALCULATED.3IONS-SCNC: 8 MMOL/L (ref 9–17)
BNP SERPL-MCNC: 161 PG/ML
BUN SERPL-MCNC: 18 MG/DL (ref 8–23)
CHLORIDE SERPL-SCNC: 99 MMOL/L (ref 98–107)
CO2 SERPL-SCNC: 26 MMOL/L (ref 20–31)
CREAT SERPL-MCNC: 1 MG/DL (ref 0.7–1.2)
GFR, ESTIMATED: 81 ML/MIN/1.73M2
POTASSIUM SERPL-SCNC: 4.9 MMOL/L (ref 3.7–5.3)
SODIUM SERPL-SCNC: 133 MMOL/L (ref 135–144)

## 2024-08-20 PROCEDURE — 99211 OFF/OP EST MAY X REQ PHY/QHP: CPT

## 2024-08-20 PROCEDURE — 82565 ASSAY OF CREATININE: CPT

## 2024-08-20 PROCEDURE — 36415 COLL VENOUS BLD VENIPUNCTURE: CPT

## 2024-08-20 PROCEDURE — 83880 ASSAY OF NATRIURETIC PEPTIDE: CPT

## 2024-08-20 PROCEDURE — 84520 ASSAY OF UREA NITROGEN: CPT

## 2024-08-20 PROCEDURE — 80051 ELECTROLYTE PANEL: CPT

## 2024-08-20 NOTE — PROGRESS NOTES
[facility]  Heart Failure Clinic      [address]  [place] [address]  Phone: [phone]  Fax: [phone]      NAME: Mike Kovacs  MEDICAL RECORD NUMBER:  [id number]  AGE: 68 y.o.   GENDER: male  : 1955  EPISODE DATE:  2024      Mike Kovacs was referred to the [facility] by Dr. Louie for heart failure services.     ECHO EF%: 35-40 Date: 24    ECHO EF%: 25-30 Date: 23      Recent Cardiology follow-up apt: 2024  Follow-up apt recommended: 6 months  Upcoming testing: none  Medication Management: yes  Nephrologist: n/a     Mike Kovacs is a 68 y.o. male here for the Heart Failure Services. He is here today for a Heart Failure assessment/consultation, monitoring medication effectiveness, reviewing necessary labs, transition of care, extensive Heart Failure education, reporting any concerns or symptoms and obtaining any necessary medication adjustments or orders from the patients health care team.    Vital Signs:   /68   Pulse 53   Resp 16   Ht 1.854 m (6' 1\")   Wt 99.3 kg (219 lb)   SpO2 96%   BMI 28.89 kg/m²    O2 Device: None (Room air)        Weight loss/gain -3 lbs      Nursing Assessment:    Respiratory:    Assessment  Charting Type: Reassessment    Breath Sounds  Respiratory Pattern: Regular  Breath Sounds Bilateral: Clear  Right Upper Lobe: Clear  Right Middle Lobe: Clear  Right Lower Lobe: Clear  Left Upper Lobe: Clear  Left Lower Lobe: Clear    Cough/Sputum  Cough: None    Cardiac  Cardiac Regularity: Regular  Heart Sounds: S1, S2, Distant/Muffled    Peripheral Vascular  Peripheral Vascular (WDL): Within Defined Limits  Edema: None      Subjective data:    Shortness of Breath:   Denies shortness of breath  Denies all other shortness of breath    Other Heart Failure Findings:   Denies cough at night or when lying down  Denies lower extremity edema  Denies fluid weight gain  Denies unusual fatigue  Denies early

## 2024-09-10 ENCOUNTER — HOSPITAL ENCOUNTER (OUTPATIENT)
Age: 69
Setting detail: SPECIMEN
Discharge: HOME | End: 2024-09-10
Payer: MEDICARE

## 2024-09-10 ENCOUNTER — PHARMACY VISIT (OUTPATIENT)
Age: 69
End: 2024-09-10
Payer: MEDICARE

## 2024-09-10 VITALS
SYSTOLIC BLOOD PRESSURE: 107 MMHG | DIASTOLIC BLOOD PRESSURE: 66 MMHG | WEIGHT: 221.4 LBS | OXYGEN SATURATION: 95 % | BODY MASS INDEX: 29.21 KG/M2 | HEART RATE: 56 BPM

## 2024-09-10 DIAGNOSIS — I50.22 CHRONIC SYSTOLIC HEART FAILURE (HCC): Primary | ICD-10-CM

## 2024-09-10 LAB
ANION GAP SERPL CALCULATED.3IONS-SCNC: 9 MMOL/L (ref 9–17)
BNP SERPL-MCNC: 212 PG/ML
BUN SERPL-MCNC: 19 MG/DL (ref 8–23)
CALCIUM SERPL-MCNC: 9.3 MG/DL (ref 8.6–10.4)
CHLORIDE SERPL-SCNC: 102 MMOL/L (ref 98–107)
CO2 SERPL-SCNC: 24 MMOL/L (ref 20–31)
CREAT SERPL-MCNC: 0.9 MG/DL (ref 0.7–1.2)
GFR, ESTIMATED: >90 ML/MIN/1.73M2
GLUCOSE SERPL-MCNC: 102 MG/DL (ref 70–99)
POTASSIUM SERPL-SCNC: 4.4 MMOL/L (ref 3.7–5.3)
SODIUM SERPL-SCNC: 135 MMOL/L (ref 135–144)

## 2024-09-10 PROCEDURE — 80048 BASIC METABOLIC PNL TOTAL CA: CPT

## 2024-09-10 PROCEDURE — 99212 OFFICE O/P EST SF 10 MIN: CPT

## 2024-09-10 PROCEDURE — 83880 ASSAY OF NATRIURETIC PEPTIDE: CPT

## 2024-09-10 PROCEDURE — 36415 COLL VENOUS BLD VENIPUNCTURE: CPT

## 2024-09-19 NOTE — PROGRESS NOTES
[facility]  Medication Management  Pharmacist  Heart Failure    [address], [address]  Phone: [phone]  Fax: [phone]      NAME: William Caro  MEDICAL RECORD NUMBER:  [id number]  AGE: 69 y.o. GENDER: male  : 1955  EPISODE DATE:  2023      Casey Braga was referred to the [facility] Medication Management Service by Dr. Hetal Hoover for heart failure services. Special Instructions per the Referral Include: Patient Education and Medication Management    Dry weight: 214.5 lb pounds     ECHO EF%: 25-30 Date: 23         This visit was performed as:  THIS VISIT WAS PERFORMED AS: An in person visit. Protocols were followed with precautions to reduce the spread of COVID-19. Subjective   Casey Braga is a 69 y.o. male here for the Heart Failure Services. They are here today for a comprehensive medication review including over the counter medications and herbal products, overall wellbeing assessment, transition of care, extensive education and any needed adjustments with updates and recommendations communicated to the referring physician. New York Heart Association Classification based on patient symptoms:  Class II: Slight limitation of physical activity. Comfortable at rest. Ordinary physical activity results in fatigue, palpitation, dyspnea (shortness of breath). Shortness of Breath:   Denies shortness of breath    Other Heart Failure Findings:   Denies cough at night or when lying down  Denies lower extremity edema  Denies fluid weight gain  Denies unusual fatigue  Denies early saiety or abdominal fullness    General Findings:  Sleeps with one pillow      Comments:    - States feels better now than in recent years  Weight decreased 214.5 lb   Home weight stable at 209-210 lbs  /74  Pulse 58  O2 Sat 95%  Labs:  23  Potassium 4.9  Creatinine 1  BNP Decreased to 306  Vaccines;    Covid  Primary vaccines and one booster  Flu Yes

## 2024-11-04 ENCOUNTER — TELEPHONE (OUTPATIENT)
Dept: OTHER | Age: 69
End: 2024-11-04

## 2024-11-04 NOTE — TELEPHONE ENCOUNTER
Reminder call to pt regarding CHF clinic appointment 11/05/24, pt verbally confirms he will be here

## 2024-11-05 ENCOUNTER — HOSPITAL ENCOUNTER (OUTPATIENT)
Dept: OTHER | Age: 69
Discharge: HOME | End: 2024-11-05
Payer: MEDICARE

## 2024-11-05 ENCOUNTER — HOSPITAL ENCOUNTER (OUTPATIENT)
Age: 69
Setting detail: SPECIMEN
Discharge: HOME | End: 2024-11-05
Payer: MEDICARE

## 2024-11-05 VITALS
WEIGHT: 224.1 LBS | SYSTOLIC BLOOD PRESSURE: 116 MMHG | DIASTOLIC BLOOD PRESSURE: 74 MMHG | HEIGHT: 73 IN | HEART RATE: 54 BPM | RESPIRATION RATE: 18 BRPM | BODY MASS INDEX: 29.7 KG/M2 | OXYGEN SATURATION: 94 %

## 2024-11-05 LAB
ANION GAP SERPL CALCULATED.3IONS-SCNC: 9 MMOL/L (ref 9–16)
BNP SERPL-MCNC: 173 PG/ML (ref 0–300)
BUN SERPL-MCNC: 24 MG/DL (ref 8–23)
CHLORIDE SERPL-SCNC: 101 MMOL/L (ref 98–107)
CO2 SERPL-SCNC: 24 MMOL/L (ref 20–31)
CREAT SERPL-MCNC: 1.2 MG/DL (ref 0.7–1.2)
GFR, ESTIMATED: 65 ML/MIN/1.73M2
POTASSIUM SERPL-SCNC: 4.7 MMOL/L (ref 3.7–5.3)
SODIUM SERPL-SCNC: 134 MMOL/L (ref 136–145)

## 2024-11-05 PROCEDURE — 99211 OFF/OP EST MAY X REQ PHY/QHP: CPT

## 2024-11-05 PROCEDURE — 83880 ASSAY OF NATRIURETIC PEPTIDE: CPT

## 2024-11-05 PROCEDURE — 80051 ELECTROLYTE PANEL: CPT

## 2024-11-05 PROCEDURE — 36415 COLL VENOUS BLD VENIPUNCTURE: CPT

## 2024-11-05 PROCEDURE — 82565 ASSAY OF CREATININE: CPT

## 2024-11-05 PROCEDURE — 84520 ASSAY OF UREA NITROGEN: CPT

## 2024-11-05 NOTE — PROGRESS NOTES
Start date: 1976     Quit date: 2016     Years since quittin.8    Smokeless tobacco: Never    Tobacco comments:     updated per Pt   Substance Use Topics    Alcohol use: Not Currently     Comment: rarely/just on special occasions       HPI: No diagnosis found.      BMP:   Lab Results   Component Value Date/Time     2024 07:46 AM    K 4.7 2024 07:46 AM     2024 07:46 AM    CO2 24 2024 07:46 AM    BUN 24 2024 07:46 AM    CREATININE 1.2 2024 07:46 AM     Lab Results   Component Value Date/Time    K 4.7 2024 07:46 AM    K 4.4 09/10/2024 08:18 AM    K 4.9 2024 07:47 AM     Lab Results   Component Value Date/Time    MG 1.7 2024 04:44 AM    MG 2.0 2023 07:10 AM     Lab Results   Component Value Date/Time    CREATININE 1.2 2024 07:46 AM    CREATININE 0.9 09/10/2024 08:18 AM    CREATININE 1.0 2024 07:47 AM    CREATININE 0.8 2024 07:56 AM    CREATININE 0.9 2024 08:49 AM    CREATININE 1.2 2024 05:16 PM       ProBNP:   Lab Results   Component Value Date/Time    PROBNP 173 2024 07:46 AM    PROBNP 212 09/10/2024 08:18 AM    PROBNP 161 2024 07:47 AM    PROBNP 338 2024 07:56 AM     [unfilled]  BP Readings from Last 3 Encounters:   24 116/74   09/10/24 107/66   24 104/68     Wt Readings from Last 6 Encounters:   24 101.7 kg (224 lb 1.6 oz)   09/10/24 100.4 kg (221 lb 6.4 oz)   24 99.3 kg (219 lb)   24 100.9 kg (222 lb 8 oz)   24 98.9 kg (218 lb)   24 99.8 kg (220 lb)       Medication Assessment      Current medications:  Current Outpatient Medications   Medication Sig Dispense Refill    clopidogrel (PLAVIX) 75 MG tablet TAKE 1 TABLET BY MOUTH DAILY 90 tablet 1    spironolactone (ALDACTONE) 25 MG tablet TAKE 1 TABLET BY MOUTH DAILY 90 tablet 1    JARDIANCE 10 MG tablet TAKE 1 TABLET BY MOUTH DAILY 90 tablet 1    lisinopril (PRINIVIL;ZESTRIL) 2.5 MG tablet TAKE 1

## 2024-12-03 ENCOUNTER — HOSPITAL ENCOUNTER (OUTPATIENT)
Age: 69
Setting detail: SPECIMEN
Discharge: HOME | End: 2024-12-03
Payer: MEDICARE

## 2024-12-03 ENCOUNTER — PHARMACY VISIT (OUTPATIENT)
Age: 69
End: 2024-12-03
Payer: MEDICARE

## 2024-12-03 VITALS
HEART RATE: 57 BPM | OXYGEN SATURATION: 95 % | DIASTOLIC BLOOD PRESSURE: 84 MMHG | SYSTOLIC BLOOD PRESSURE: 109 MMHG | BODY MASS INDEX: 29.25 KG/M2 | WEIGHT: 221.7 LBS

## 2024-12-03 DIAGNOSIS — I50.22 CHRONIC SYSTOLIC HEART FAILURE (HCC): Primary | ICD-10-CM

## 2024-12-03 LAB
ANION GAP SERPL CALCULATED.3IONS-SCNC: 12 MMOL/L (ref 9–16)
BNP SERPL-MCNC: 157 PG/ML (ref 0–300)
BUN SERPL-MCNC: 19 MG/DL (ref 8–23)
CALCIUM SERPL-MCNC: 9.5 MG/DL (ref 8.6–10.4)
CHLORIDE SERPL-SCNC: 103 MMOL/L (ref 98–107)
CO2 SERPL-SCNC: 20 MMOL/L (ref 20–31)
CREAT SERPL-MCNC: 1.1 MG/DL (ref 0.7–1.2)
GFR, ESTIMATED: 72 ML/MIN/1.73M2
GLUCOSE SERPL-MCNC: 107 MG/DL (ref 74–99)
POTASSIUM SERPL-SCNC: 4.5 MMOL/L (ref 3.7–5.3)
SODIUM SERPL-SCNC: 135 MMOL/L (ref 136–145)

## 2024-12-03 PROCEDURE — 99212 OFFICE O/P EST SF 10 MIN: CPT

## 2024-12-03 PROCEDURE — 83880 ASSAY OF NATRIURETIC PEPTIDE: CPT

## 2024-12-03 PROCEDURE — 36415 COLL VENOUS BLD VENIPUNCTURE: CPT

## 2024-12-03 PROCEDURE — 80048 BASIC METABOLIC PNL TOTAL CA: CPT

## 2024-12-03 NOTE — PROGRESS NOTES
[facility]  Medication Management  Pharmacist  Heart Failure    [address]  [place], [place] [address]  Phone: [phone]  Fax: [phone]      NAME: Mike Kovacs  MEDICAL RECORD NUMBER:  [id number]  AGE: 69 y.o.   GENDER: male  : 1955  EPISODE DATE:  12/3/2024      Mike Kovacs was referred to the [place] Medication Management Service by Dr. HOMER Louie for heart failure services.  Special Instructions per the Referral Include: Patient Education and Medication Management    Dry weight: 221.4 lb pounds     ECHO EF%: 35-40 Date: 24   ECHO EF%: 25-30 Date: 23         This visit was performed as:  THIS VISIT WAS PERFORMED AS: An in person visit. Protocols were followed with precautions to reduce the spread of COVID-19.     Subjective   Mike Kovacs is a 69 y.o. male here for the Heart Failure Services.    They are here today for a comprehensive medication review including over the counter medications and herbal products, overall wellbeing assessment, transition of care, extensive education and any needed adjustments with updates and recommendations communicated to the referring physician.      New York Heart Association Classification based on patient symptoms:  Class I: No limitation of physical activity.  Ordinary physical activity does not cause undue fatigue, palpitation, dyspnea (shortness of breath).    Shortness of Breath:   Denies shortness of breath    Other Heart Failure Findings:   Denies cough at night or when lying down  Denies lower extremity edema  Denies fluid weight gain  Denies unusual fatigue  Denies early saiety or abdominal fullness    General Findings:  No other general findings noted     Comments:   Labs Today  Potassium 4.5 Stable  Creatinine 1.1  Stable    Down    Exercise:  Treadmill when weather warmer or unable to walk outside.   Walks at stores  Diet: Tries to eat more vegetables, salads, chicken, rarely steak. Uses a salt substitute.

## 2025-01-07 ENCOUNTER — HOSPITAL ENCOUNTER (OUTPATIENT)
Age: 70
Setting detail: SPECIMEN
Discharge: HOME | End: 2025-01-07
Payer: MEDICARE

## 2025-01-07 ENCOUNTER — PHARMACY VISIT (OUTPATIENT)
Age: 70
End: 2025-01-07
Payer: MEDICARE

## 2025-01-07 VITALS
DIASTOLIC BLOOD PRESSURE: 77 MMHG | SYSTOLIC BLOOD PRESSURE: 119 MMHG | BODY MASS INDEX: 29.78 KG/M2 | HEART RATE: 51 BPM | WEIGHT: 225.7 LBS | OXYGEN SATURATION: 97 %

## 2025-01-07 DIAGNOSIS — I50.9 CHRONIC HEART FAILURE, UNSPECIFIED HEART FAILURE TYPE (HCC): Primary | ICD-10-CM

## 2025-01-07 LAB
ANION GAP SERPL CALCULATED.3IONS-SCNC: 8 MMOL/L (ref 9–16)
BNP SERPL-MCNC: 247 PG/ML (ref 0–300)
BUN SERPL-MCNC: 18 MG/DL (ref 8–23)
CALCIUM SERPL-MCNC: 9.6 MG/DL (ref 8.6–10.4)
CHLORIDE SERPL-SCNC: 106 MMOL/L (ref 98–107)
CO2 SERPL-SCNC: 24 MMOL/L (ref 20–31)
CREAT SERPL-MCNC: 1 MG/DL (ref 0.7–1.2)
GFR, ESTIMATED: 80 ML/MIN/1.73M2
GLUCOSE SERPL-MCNC: 103 MG/DL (ref 74–99)
POTASSIUM SERPL-SCNC: 4.9 MMOL/L (ref 3.7–5.3)
SODIUM SERPL-SCNC: 138 MMOL/L (ref 136–145)

## 2025-01-07 PROCEDURE — 83880 ASSAY OF NATRIURETIC PEPTIDE: CPT

## 2025-01-07 PROCEDURE — 36415 COLL VENOUS BLD VENIPUNCTURE: CPT

## 2025-01-07 PROCEDURE — 80048 BASIC METABOLIC PNL TOTAL CA: CPT

## 2025-01-07 PROCEDURE — 99213 OFFICE O/P EST LOW 20 MIN: CPT

## 2025-01-07 NOTE — PATIENT INSTRUCTIONS
Follow up plan:     Decrease  Toprol XL (metoprolol succinate)  25 mg to 12.5  mg (1/2 tab)daily  Continue Lisinopril 2.5 mg daily  Continue Spironolactone 25 mg daily    Check weight daily, if increase 3 lbs in 1 day or 5  lbs in 1 week, call clinic [phone]

## 2025-01-07 NOTE — PROGRESS NOTES
changes in symptoms.     BNP is elevated compared to recent past, likely due to Jardiance discontinuation.   Labs Today 2025  Potassium 4.9 Stable  Creatinine 1.0  Stable   UP from 157 on 12/3/2024, but stable if compared to BNP from 2024    Pt records bradycardia in office today, with heart rate 50-51. HR at the last visit was 57.  Will decrease dose of metoprolol succinate to 12.5 mg (1/2 tab) daily. If BP will be elevated at the next visit will plan Lisinopril dose increase.       Exercise:  Treadmill broke, only walks around the house. Walks about 1-2 pete per day.   when weather warmer or unable to walk outside.   Walks at stores  Diet: Tries to eat more vegetables, salads, chicken, rarely steak. Uses a salt substitute. Reminded to watch potassium and sodium in that. Does not eat canned foods. Does use frozen vegetables. Encouraged to keep sodium restriction.  Appetite Good    Fluids: educated to keep fluids under 64 oz:   2-3 bottles of water, 12 oz coffee, 1 cup tea 12 oz,  3-4 bottled water and one cup coffe and one cup tea  == 72 oz    Weight  Stable at home around 218-220  lb      Objective   Visit Vitals 2025:  Weight: 225.7 lb  BP: 119/77  HR: 51  SpO2: 97 %    Patient Active Problem List   Diagnosis Code    NSTEMI (non-ST elevated myocardial infarction) ([facility]) I21.4    Chronic GERD K21.9    Dyslipidemia E78.5    Hyponatremia E87.1    Acute cystitis N30.00     Social History     Tobacco Use    Smoking status: Former     Current packs/day: 0.00     Average packs/day: 1 pack/day for 40.0 years (40.0 ttl pk-yrs)     Types: Cigarettes     Start date: 1976     Quit date: 2016     Years since quittin.0    Smokeless tobacco: Never    Tobacco comments:     updated per Pt   Substance Use Topics    Alcohol use: Not Currently     Comment: rarely/just on special occasions       HPI: No diagnosis found.      BMP:   Lab Results   Component Value Date/Time     2024 08:19 AM

## 2025-01-20 ENCOUNTER — TELEPHONE (OUTPATIENT)
Age: 70
End: 2025-01-20

## 2025-01-20 RX ORDER — METOPROLOL SUCCINATE 25 MG/1
12.5 TABLET, EXTENDED RELEASE ORAL DAILY
Qty: 45 TABLET | Refills: 1 | Status: SHIPPED | OUTPATIENT
Start: 2025-01-20

## 2025-01-20 NOTE — TELEPHONE ENCOUNTER
Prescription for Toprol XL 25 mg tablets- 1/2 tablet once daily #45 tabs with 1 refill sent to [facility]. Receipt confirmed by pharmacy  Chanelle Soria, Pharm D, BCPS, CACP  [facility] Medication Management [facility]  1/20/2025 2:11 PM

## 2025-02-03 ENCOUNTER — TELEPHONE (OUTPATIENT)
Dept: OTHER | Age: 70
End: 2025-02-03

## 2025-02-03 NOTE — TELEPHONE ENCOUNTER
Reminder call to pt regarding CHF appt 02/04/25 at 8 am, [facility] for pt with instructions to check in with lab for blood work first. Call back number provided [phone]

## 2025-02-04 ENCOUNTER — HOSPITAL ENCOUNTER (OUTPATIENT)
Age: 70
Setting detail: SPECIMEN
Discharge: HOME | End: 2025-02-04
Payer: MEDICARE

## 2025-02-04 ENCOUNTER — HOSPITAL ENCOUNTER (OUTPATIENT)
Dept: OTHER | Age: 70
Discharge: HOME | End: 2025-02-04
Payer: MEDICARE

## 2025-02-04 VITALS
DIASTOLIC BLOOD PRESSURE: 84 MMHG | BODY MASS INDEX: 29.86 KG/M2 | SYSTOLIC BLOOD PRESSURE: 130 MMHG | HEART RATE: 58 BPM | RESPIRATION RATE: 18 BRPM | HEIGHT: 73 IN | WEIGHT: 225.3 LBS | OXYGEN SATURATION: 96 %

## 2025-02-04 LAB
ANION GAP SERPL CALCULATED.3IONS-SCNC: 11 MMOL/L (ref 9–16)
BNP SERPL-MCNC: 241 PG/ML (ref 0–300)
BUN SERPL-MCNC: 15 MG/DL (ref 8–23)
CHLORIDE SERPL-SCNC: 103 MMOL/L (ref 98–107)
CO2 SERPL-SCNC: 24 MMOL/L (ref 20–31)
CREAT SERPL-MCNC: 1 MG/DL (ref 0.7–1.2)
GFR, ESTIMATED: 80 ML/MIN/1.73M2
POTASSIUM SERPL-SCNC: 4.6 MMOL/L (ref 3.7–5.3)
SODIUM SERPL-SCNC: 138 MMOL/L (ref 136–145)

## 2025-02-04 PROCEDURE — 36415 COLL VENOUS BLD VENIPUNCTURE: CPT

## 2025-02-04 PROCEDURE — 84520 ASSAY OF UREA NITROGEN: CPT

## 2025-02-04 PROCEDURE — 83880 ASSAY OF NATRIURETIC PEPTIDE: CPT

## 2025-02-04 PROCEDURE — 82565 ASSAY OF CREATININE: CPT

## 2025-02-04 PROCEDURE — 80051 ELECTROLYTE PANEL: CPT

## 2025-02-04 PROCEDURE — 99211 OFF/OP EST MAY X REQ PHY/QHP: CPT

## 2025-02-04 NOTE — PROGRESS NOTES
daily      vitamin D (CHOLECALCIFEROL) 25 MCG (1000 UT) TABS tablet Take 1 tablet by mouth daily      aspirin 81 MG chewable tablet Take 1 tablet by mouth daily 30 tablet 3     No current facility-administered medications for this encounter.          Heart Failure medications:    Lisinopril 2.5mg daily  Metoprolol Succinate 12.5mg daily  Spironolactone 25mg daily        Get With The Guidelines:  Mr. Kovacs is on a Beta-Blocker for (HFrEF) (systolic) EF </= 40%  Yes    Mr. Kovacs is on an Ace-Inhibitor / ARB / Entresto for (HFrEF) (systolic) EF </= 40% Yes      Mr. Kovacs is on a Aldosterone Receptor Antagonist for (HFrEF) (systolic) EF </= 35% or EF </= 40% with MI (Okay to use if SCr </= 2.5mg/dL in men, SCr </= 2mg/dL in women; Potassium < 5.0meq/L) Yes    Mr. Kovacs is on a Diuretic No  Mr. Kovacs is on a SGLT2 Inhibitor No: stopped due to cost      Heart Failure Medication Adherence: Currently taking their Heart Failure medications as prescribed.    Non-Heart Failure Medication Adherence: Currently taking their non-Heart Failure medications as prescribed.    Medication Adverse Reactions Noted: none    Barriers to Medication: Cost    Recent Medication changes: yes - 1/20/25 Pt reported he had stopped Jardiance due to cost and Metoprolol Succinate decreased to 12.5mg daily      Further Assessment / Education     Verbally reviewed importance of medication compliance with patient; patient verbalized understanding.    Discussed 1500mg/day sodium restricted diet; patient verbalized understanding.    Moderate daily exercise encouraged as tolerated. Discussed rest breaks as needed; patient verbalized understanding.    Patient instructed to weigh self at the same time of each day, using same clothes and same scale; reinforced teaching to monitor for 3-5 lb weight increase over 1-2 days, and to notify the CHF clinic at [phone] or physician office if weight change noted.  Patient verbalized

## 2025-03-03 ENCOUNTER — TELEPHONE (OUTPATIENT)
Age: 70
End: 2025-03-03

## 2025-03-03 NOTE — TELEPHONE ENCOUNTER
Patient called to reschedule appointment in medication management clinic. States father has appt at same time and unable to reschedule that appt. Appt rescheduled for next week 3/12/25  Chanelle Soria, Pharm D, BCPS, CACP  [facility] Medication Management Clinic  3/3/2025 8:35 AM

## 2025-03-12 ENCOUNTER — PHARMACY VISIT (OUTPATIENT)
Age: 70
End: 2025-03-12
Payer: MEDICARE

## 2025-03-12 ENCOUNTER — HOSPITAL ENCOUNTER (OUTPATIENT)
Age: 70
Setting detail: SPECIMEN
Discharge: HOME | End: 2025-03-12
Payer: MEDICARE

## 2025-03-12 VITALS
BODY MASS INDEX: 29.91 KG/M2 | OXYGEN SATURATION: 94 % | DIASTOLIC BLOOD PRESSURE: 72 MMHG | HEART RATE: 80 BPM | SYSTOLIC BLOOD PRESSURE: 106 MMHG | WEIGHT: 226.7 LBS

## 2025-03-12 DIAGNOSIS — I50.32 CHRONIC DIASTOLIC HEART FAILURE (HCC): Primary | ICD-10-CM

## 2025-03-12 LAB
ANION GAP SERPL CALCULATED.3IONS-SCNC: 10 MMOL/L (ref 9–16)
BNP SERPL-MCNC: 326 PG/ML (ref 0–300)
BUN SERPL-MCNC: 17 MG/DL (ref 8–23)
CALCIUM SERPL-MCNC: 9.1 MG/DL (ref 8.6–10.4)
CHLORIDE SERPL-SCNC: 104 MMOL/L (ref 98–107)
CO2 SERPL-SCNC: 24 MMOL/L (ref 20–31)
CREAT SERPL-MCNC: 0.9 MG/DL (ref 0.7–1.2)
GFR, ESTIMATED: >90 ML/MIN/1.73M2
GLUCOSE SERPL-MCNC: 102 MG/DL (ref 74–99)
POTASSIUM SERPL-SCNC: 4.8 MMOL/L (ref 3.7–5.3)
SODIUM SERPL-SCNC: 138 MMOL/L (ref 136–145)

## 2025-03-12 PROCEDURE — 83880 ASSAY OF NATRIURETIC PEPTIDE: CPT

## 2025-03-12 PROCEDURE — 80048 BASIC METABOLIC PNL TOTAL CA: CPT

## 2025-03-12 PROCEDURE — 36415 COLL VENOUS BLD VENIPUNCTURE: CPT

## 2025-03-12 PROCEDURE — 99212 OFFICE O/P EST SF 10 MIN: CPT

## 2025-03-12 NOTE — PROGRESS NOTES
[facility]  Medication Management  Pharmacist  Heart Failure    [address]  [place], [place] [address]  Phone: [phone]  Fax: [phone]      NAME: Mike Kovacs  MEDICAL RECORD NUMBER:  [id number]  AGE: 69 y.o.   GENDER: male  : 1955  EPISODE DATE:  3/12/2025      Mike Kovacs was referred to the [place] Medication Management Service by Dr. HOMER Louie for heart failure services.  Special Instructions per the Referral Include: Patient Education and Medication Management    Dry weight: 226.7 lb pounds     ECHO EF%: 35-40 Date: 24   ECHO EF%: 25-30 Date: 23         This visit was performed as:  THIS VISIT WAS PERFORMED AS: An in person visit. Protocols were followed with precautions to reduce the spread of COVID-19.     Subjective   Mike Kovacs is a 69 y.o. male here for the Heart Failure Services.    They are here today for a comprehensive medication review including over the counter medications and herbal products, overall wellbeing assessment, transition of care, extensive education and any needed adjustments with updates and recommendations communicated to the referring physician.      New York Heart Association Classification based on patient symptoms:  Class I: No limitation of physical activity.  Ordinary physical activity does not cause undue fatigue, palpitation, dyspnea (shortness of breath).    Shortness of Breath:   Denies shortness of breath    Other Heart Failure Findings:   Denies cough at night or when lying down  Denies lower extremity edema  Denies fluid weight gain  Denies unusual fatigue  Denies early saiety or abdominal fullness    General Findings:  No other general findings noted   Aspirin 81 mg daily  Pravachol 80 mg daily    Comments:   Weight 226.7 lb  /72  Pulse 80  O2 Sat 94%  Labs Today    Potassium 4.8  Creatinine 0.9    Weight Stable at home 224-225 lb    Exercise:  Walks 1 mile daily  Sometimes walks 5-6 miles weather

## 2025-04-15 ENCOUNTER — HOSPITAL ENCOUNTER (OUTPATIENT)
Age: 70
Discharge: HOME | End: 2025-04-15
Payer: MEDICARE

## 2025-04-15 DIAGNOSIS — E78.2 MIXED HYPERLIPIDEMIA: ICD-10-CM

## 2025-04-15 LAB
AST SERPL-CCNC: 19 U/L (ref 10–50)
CHOLEST SERPL-MCNC: 141 MG/DL (ref 0–199)
CHOLESTEROL/HDL RATIO: 3.1
CK SERPL-CCNC: 241 U/L (ref 39–308)
HDLC SERPL-MCNC: 45 MG/DL
LDLC SERPL CALC-MCNC: 82 MG/DL (ref 0–100)
TRIGL SERPL-MCNC: 72 MG/DL (ref 0–149)

## 2025-04-15 PROCEDURE — 82550 ASSAY OF CK (CPK): CPT

## 2025-04-15 PROCEDURE — 36415 COLL VENOUS BLD VENIPUNCTURE: CPT

## 2025-04-15 PROCEDURE — 80061 LIPID PANEL: CPT

## 2025-04-15 PROCEDURE — 84450 TRANSFERASE (AST) (SGOT): CPT

## 2025-06-06 ENCOUNTER — HOSPITAL ENCOUNTER (EMERGENCY)
Age: 70
Discharge: HOME | End: 2025-06-06
Attending: EMERGENCY MEDICINE
Payer: MEDICARE

## 2025-06-06 ENCOUNTER — APPOINTMENT (OUTPATIENT)
Dept: CT IMAGING | Age: 70
End: 2025-06-06
Payer: MEDICARE

## 2025-06-06 VITALS
RESPIRATION RATE: 18 BRPM | HEIGHT: 73 IN | SYSTOLIC BLOOD PRESSURE: 135 MMHG | DIASTOLIC BLOOD PRESSURE: 63 MMHG | TEMPERATURE: 98.2 F | BODY MASS INDEX: 29.69 KG/M2 | WEIGHT: 224 LBS | OXYGEN SATURATION: 95 % | HEART RATE: 80 BPM

## 2025-06-06 DIAGNOSIS — K57.32 DIVERTICULITIS OF COLON: Primary | ICD-10-CM

## 2025-06-06 LAB
ALBUMIN SERPL-MCNC: 4.1 G/DL (ref 3.5–5.2)
ALP SERPL-CCNC: 54 U/L (ref 40–129)
ALT SERPL-CCNC: 13 U/L (ref 10–50)
ANION GAP SERPL CALCULATED.3IONS-SCNC: 16 MMOL/L (ref 9–16)
AST SERPL-CCNC: 15 U/L (ref 10–50)
BACTERIA URNS QL MICRO: ABNORMAL
BASOPHILS # BLD: 0.04 K/UL (ref 0–0.2)
BASOPHILS NFR BLD: 0 % (ref 0–2)
BILIRUB DIRECT SERPL-MCNC: 0.5 MG/DL (ref 0–0.3)
BILIRUB INDIRECT SERPL-MCNC: 0.7 MG/DL (ref 0–1)
BILIRUB SERPL-MCNC: 1.2 MG/DL (ref 0–1.2)
BILIRUB UR QL STRIP: NEGATIVE
BUN SERPL-MCNC: 13 MG/DL (ref 8–23)
CALCIUM SERPL-MCNC: 9.3 MG/DL (ref 8.6–10.4)
CASTS #/AREA URNS LPF: ABNORMAL /LPF
CHLORIDE SERPL-SCNC: 100 MMOL/L (ref 98–107)
CLARITY UR: CLEAR
CO2 SERPL-SCNC: 16 MMOL/L (ref 20–31)
COLOR UR: YELLOW
CREAT SERPL-MCNC: 1.1 MG/DL (ref 0.7–1.2)
EOSINOPHIL # BLD: 0.03 K/UL (ref 0–0.44)
EOSINOPHILS RELATIVE PERCENT: 0 % (ref 0–4)
EPI CELLS #/AREA URNS HPF: ABNORMAL /HPF
ERYTHROCYTE [DISTWIDTH] IN BLOOD BY AUTOMATED COUNT: 13.3 % (ref 11.5–14.9)
GFR, ESTIMATED: 72 ML/MIN/1.73M2
GLUCOSE SERPL-MCNC: 116 MG/DL (ref 74–99)
GLUCOSE UR STRIP-MCNC: NEGATIVE MG/DL
HCT VFR BLD AUTO: 39.3 % (ref 41–53)
HGB BLD-MCNC: 13.3 G/DL (ref 13.5–17.5)
HGB UR QL STRIP.AUTO: ABNORMAL
IMM GRANULOCYTES # BLD AUTO: 0.08 K/UL (ref 0–0.3)
IMM GRANULOCYTES NFR BLD: 1 %
INR PPP: 1.2
KETONES UR STRIP-MCNC: ABNORMAL MG/DL
LEUKOCYTE ESTERASE UR QL STRIP: NEGATIVE
LIPASE SERPL-CCNC: 20 U/L (ref 13–60)
LYMPHOCYTES NFR BLD: 0.91 K/UL (ref 1.1–3.7)
LYMPHOCYTES RELATIVE PERCENT: 8 % (ref 24–44)
MCH RBC QN AUTO: 29.2 PG (ref 26–34)
MCHC RBC AUTO-ENTMCNC: 33.8 G/DL (ref 31–37)
MCV RBC AUTO: 86.4 FL (ref 80–100)
MONOCYTES NFR BLD: 0.87 K/UL (ref 0.1–1.2)
MONOCYTES NFR BLD: 8 % (ref 3–12)
NEUTROPHILS NFR BLD: 83 % (ref 36–66)
NEUTS SEG NFR BLD: 9.24 K/UL (ref 1.5–8.1)
NITRITE UR QL STRIP: NEGATIVE
NRBC BLD-RTO: 0 PER 100 WBC
PH UR STRIP: 5 [PH] (ref 5–8)
PLATELET # BLD AUTO: 162 K/UL (ref 150–450)
PMV BLD AUTO: 10.2 FL (ref 8–13.5)
POTASSIUM SERPL-SCNC: 4 MMOL/L (ref 3.7–5.3)
PROT SERPL-MCNC: 7.5 G/DL (ref 6.6–8.7)
PROT UR STRIP-MCNC: ABNORMAL MG/DL
PROTHROMBIN TIME: 15.9 SEC (ref 11.8–14.6)
RBC # BLD AUTO: 4.55 M/UL (ref 4.21–5.77)
RBC #/AREA URNS HPF: ABNORMAL /HPF
SODIUM SERPL-SCNC: 132 MMOL/L (ref 136–145)
SP GR UR STRIP: 1.02 (ref 1–1.03)
UROBILINOGEN UR STRIP-ACNC: NORMAL EU/DL (ref 0–1)
WBC #/AREA URNS HPF: ABNORMAL /HPF
WBC OTHER # BLD: 11.2 K/UL (ref 3.5–11)

## 2025-06-06 PROCEDURE — 6360000002 HC RX W HCPCS: Performed by: EMERGENCY MEDICINE

## 2025-06-06 PROCEDURE — 96374 THER/PROPH/DIAG INJ IV PUSH: CPT

## 2025-06-06 PROCEDURE — 85610 PROTHROMBIN TIME: CPT

## 2025-06-06 PROCEDURE — 2500000003 HC RX 250 WO HCPCS: Performed by: EMERGENCY MEDICINE

## 2025-06-06 PROCEDURE — 6360000004 HC RX CONTRAST MEDICATION: Performed by: EMERGENCY MEDICINE

## 2025-06-06 PROCEDURE — 80076 HEPATIC FUNCTION PANEL: CPT

## 2025-06-06 PROCEDURE — 6370000000 HC RX 637 (ALT 250 FOR IP): Performed by: EMERGENCY MEDICINE

## 2025-06-06 PROCEDURE — 81001 URINALYSIS AUTO W/SCOPE: CPT

## 2025-06-06 PROCEDURE — 85025 COMPLETE CBC W/AUTO DIFF WBC: CPT

## 2025-06-06 PROCEDURE — 2580000003 HC RX 258: Performed by: EMERGENCY MEDICINE

## 2025-06-06 PROCEDURE — 80048 BASIC METABOLIC PNL TOTAL CA: CPT

## 2025-06-06 PROCEDURE — 83690 ASSAY OF LIPASE: CPT

## 2025-06-06 PROCEDURE — 99285 EMERGENCY DEPT VISIT HI MDM: CPT

## 2025-06-06 PROCEDURE — 74177 CT ABD & PELVIS W/CONTRAST: CPT

## 2025-06-06 PROCEDURE — 36415 COLL VENOUS BLD VENIPUNCTURE: CPT

## 2025-06-06 RX ORDER — MORPHINE SULFATE 2 MG/ML
2 INJECTION, SOLUTION INTRAMUSCULAR; INTRAVENOUS ONCE
Refills: 0 | Status: COMPLETED | OUTPATIENT
Start: 2025-06-06 | End: 2025-06-06

## 2025-06-06 RX ORDER — ONDANSETRON 4 MG/1
4 TABLET, ORALLY DISINTEGRATING ORAL 3 TIMES DAILY PRN
Qty: 21 TABLET | Refills: 0 | Status: SHIPPED | OUTPATIENT
Start: 2025-06-06

## 2025-06-06 RX ORDER — SODIUM CHLORIDE 0.9 % (FLUSH) 0.9 %
10 SYRINGE (ML) INJECTION PRN
Status: DISCONTINUED | OUTPATIENT
Start: 2025-06-06 | End: 2025-06-06 | Stop reason: HOSPADM

## 2025-06-06 RX ORDER — IOPAMIDOL 755 MG/ML
75 INJECTION, SOLUTION INTRAVASCULAR
Status: COMPLETED | OUTPATIENT
Start: 2025-06-06 | End: 2025-06-06

## 2025-06-06 RX ORDER — CIPROFLOXACIN 500 MG/1
500 TABLET, FILM COATED ORAL ONCE
Status: COMPLETED | OUTPATIENT
Start: 2025-06-06 | End: 2025-06-06

## 2025-06-06 RX ORDER — METRONIDAZOLE 500 MG/1
500 TABLET ORAL ONCE
Status: COMPLETED | OUTPATIENT
Start: 2025-06-06 | End: 2025-06-06

## 2025-06-06 RX ORDER — 0.9 % SODIUM CHLORIDE 0.9 %
100 INTRAVENOUS SOLUTION INTRAVENOUS ONCE
Status: COMPLETED | OUTPATIENT
Start: 2025-06-06 | End: 2025-06-06

## 2025-06-06 RX ORDER — METRONIDAZOLE 500 MG/1
500 TABLET ORAL 2 TIMES DAILY
Qty: 14 TABLET | Refills: 0 | Status: SHIPPED | OUTPATIENT
Start: 2025-06-06 | End: 2025-06-13

## 2025-06-06 RX ORDER — IBUPROFEN 600 MG/1
600 TABLET, FILM COATED ORAL 3 TIMES DAILY PRN
Qty: 30 TABLET | Refills: 0 | Status: SHIPPED | OUTPATIENT
Start: 2025-06-06

## 2025-06-06 RX ORDER — CIPROFLOXACIN 500 MG/1
500 TABLET, FILM COATED ORAL 2 TIMES DAILY
Qty: 14 TABLET | Refills: 0 | Status: SHIPPED | OUTPATIENT
Start: 2025-06-06 | End: 2025-06-13

## 2025-06-06 RX ORDER — HYDROCODONE BITARTRATE AND ACETAMINOPHEN 5; 325 MG/1; MG/1
1 TABLET ORAL EVERY 8 HOURS PRN
Qty: 8 TABLET | Refills: 0 | Status: SHIPPED | OUTPATIENT
Start: 2025-06-06 | End: 2025-06-09

## 2025-06-06 RX ORDER — ONDANSETRON 4 MG/1
4 TABLET, ORALLY DISINTEGRATING ORAL ONCE
Status: COMPLETED | OUTPATIENT
Start: 2025-06-06 | End: 2025-06-06

## 2025-06-06 RX ADMIN — CIPROFLOXACIN HYDROCHLORIDE 500 MG: 500 TABLET, FILM COATED ORAL at 10:25

## 2025-06-06 RX ADMIN — IOPAMIDOL 75 ML: 755 INJECTION, SOLUTION INTRAVENOUS at 09:12

## 2025-06-06 RX ADMIN — SODIUM CHLORIDE 100 ML: 9 INJECTION, SOLUTION INTRAVENOUS at 09:12

## 2025-06-06 RX ADMIN — SODIUM CHLORIDE, PRESERVATIVE FREE 10 ML: 5 INJECTION INTRAVENOUS at 09:12

## 2025-06-06 RX ADMIN — MORPHINE SULFATE 2 MG: 2 INJECTION, SOLUTION INTRAMUSCULAR; INTRAVENOUS at 10:29

## 2025-06-06 RX ADMIN — METRONIDAZOLE 500 MG: 500 TABLET ORAL at 10:25

## 2025-06-06 RX ADMIN — ONDANSETRON 4 MG: 4 TABLET, ORALLY DISINTEGRATING ORAL at 10:41

## 2025-06-06 ASSESSMENT — PAIN SCALES - GENERAL: PAINLEVEL_OUTOF10: 8

## 2025-06-06 ASSESSMENT — ENCOUNTER SYMPTOMS
VOMITING: 0
PHOTOPHOBIA: 0
NAUSEA: 1
ABDOMINAL PAIN: 1
COLOR CHANGE: 0
SHORTNESS OF BREATH: 0
DIARRHEA: 0
COUGH: 0
TROUBLE SWALLOWING: 0

## 2025-06-06 ASSESSMENT — LIFESTYLE VARIABLES
HOW OFTEN DO YOU HAVE A DRINK CONTAINING ALCOHOL: NEVER
HOW MANY STANDARD DRINKS CONTAINING ALCOHOL DO YOU HAVE ON A TYPICAL DAY: PATIENT DOES NOT DRINK

## 2025-06-06 ASSESSMENT — PAIN - FUNCTIONAL ASSESSMENT: PAIN_FUNCTIONAL_ASSESSMENT: 0-10

## 2025-06-06 NOTE — ED PROVIDER NOTES
EMERGENCY DEPARTMENT ENCOUNTER    Pt Name: Mike Kovacs  MRN: [id number]  Birthdate 1955  Date of evaluation: 6/6/25  CHIEF COMPLAINT       Chief Complaint   Patient presents with    Abdominal Pain    Nausea     HISTORY OF PRESENT ILLNESS   69-year-old male presenting to the ER complaining of right lower quadrant and suprapubic abdominal pain.  Patient states symptoms started on Wednesday and they started on the left side and migrated over now to the right lower quadrant.  Patient does admit to still having his appendix.  The patient admits to decreased appetite as well as nausea but no vomiting.  Patient states his last bowel movement was 2 days ago.    The history is provided by the patient.   Abdominal Pain  Pain location:  RLQ, suprapubic and LLQ  Pain quality: aching    Associated symptoms: nausea    Associated symptoms: no chest pain, no cough, no diarrhea, no dysuria, no fatigue, no fever, no shortness of breath and no vomiting            REVIEW OF SYSTEMS     Review of Systems   Constitutional:  Positive for appetite change. Negative for activity change, fatigue and fever.   HENT:  Negative for congestion, ear pain and trouble swallowing.    Eyes:  Negative for photophobia and visual disturbance.   Respiratory:  Negative for cough and shortness of breath.    Cardiovascular:  Negative for chest pain and palpitations.   Gastrointestinal:  Positive for abdominal pain and nausea. Negative for diarrhea and vomiting.   Genitourinary:  Negative for dysuria, flank pain and urgency.   Musculoskeletal:  Negative for arthralgias and myalgias.   Skin:  Negative for color change and rash.   Neurological:  Negative for dizziness and facial asymmetry.   Psychiatric/Behavioral:  Negative for agitation and behavioral problems.      PASTMEDICAL HISTORY     Past Medical History:   Diagnosis Date    CAD (coronary artery disease)     CHF (congestive heart failure) (HCC)     Hyperlipidemia     NSTEMI (non-ST elevation

## 2025-06-06 NOTE — ED TRIAGE NOTES
Mode of arrival (squad #, walk in, police, etc) : Walk in        Chief complaint(s): Abdominal pain        Arrival Note (brief scenario, treatment PTA, etc).: Pt arrives to ED c/o lower abdominal pain ongoing ongoing for the last 2 days. Patient states that the pain is now int he right lower quadrant of his abdomen. Patient reports nausea.

## 2025-06-11 ENCOUNTER — PHARMACY VISIT (OUTPATIENT)
Age: 70
End: 2025-06-11
Payer: MEDICARE

## 2025-06-11 ENCOUNTER — HOSPITAL ENCOUNTER (OUTPATIENT)
Age: 70
Setting detail: SPECIMEN
Discharge: HOME | End: 2025-06-11
Payer: MEDICARE

## 2025-06-11 VITALS
SYSTOLIC BLOOD PRESSURE: 113 MMHG | BODY MASS INDEX: 29.14 KG/M2 | DIASTOLIC BLOOD PRESSURE: 74 MMHG | WEIGHT: 220.9 LBS | OXYGEN SATURATION: 95 % | HEART RATE: 57 BPM

## 2025-06-11 DIAGNOSIS — I50.22 CHRONIC SYSTOLIC HEART FAILURE (HCC): Primary | ICD-10-CM

## 2025-06-11 LAB
ANION GAP SERPL CALCULATED.3IONS-SCNC: 14 MMOL/L (ref 9–16)
BNP SERPL-MCNC: 388 PG/ML (ref 0–300)
BUN SERPL-MCNC: 13 MG/DL (ref 8–23)
CALCIUM SERPL-MCNC: 9.5 MG/DL (ref 8.6–10.4)
CHLORIDE SERPL-SCNC: 103 MMOL/L (ref 98–107)
CO2 SERPL-SCNC: 20 MMOL/L (ref 20–31)
CREAT SERPL-MCNC: 1 MG/DL (ref 0.7–1.2)
GFR, ESTIMATED: 80 ML/MIN/1.73M2
GLUCOSE SERPL-MCNC: 122 MG/DL (ref 74–99)
POTASSIUM SERPL-SCNC: 4.7 MMOL/L (ref 3.7–5.3)
SODIUM SERPL-SCNC: 137 MMOL/L (ref 136–145)

## 2025-06-11 PROCEDURE — 80048 BASIC METABOLIC PNL TOTAL CA: CPT

## 2025-06-11 PROCEDURE — 83880 ASSAY OF NATRIURETIC PEPTIDE: CPT

## 2025-06-11 PROCEDURE — 36415 COLL VENOUS BLD VENIPUNCTURE: CPT

## 2025-06-11 PROCEDURE — 99212 OFFICE O/P EST SF 10 MIN: CPT

## 2025-06-11 NOTE — PROGRESS NOTES
07:09 AM       HgA1C:   Lab Results   Component Value Date    LABA1C 5.2 04/23/2023       TSH: (ref range 0.27 - 4.20 uIU/mL)  Lab Results   Component Value Date    TSH 2.35 04/24/2023       Lipids:   Lab Results   Component Value Date/Time    CHOL 141 04/15/2025 08:42 AM    TRIG 72 04/15/2025 08:42 AM    HDL 45 04/15/2025 08:42 AM    VLDL NOT REPORTED 03/11/2021 10:15 AM       ProBNP:   Lab Results   Component Value Date/Time    PROBNP 388 06/11/2025 08:07 AM    PROBNP 326 03/12/2025 08:08 AM    PROBNP 241 02/04/2025 07:41 AM       BP Readings from Last 3 Encounters:   06/11/25 113/74   06/06/25 135/63   04/04/25 (!) 142/86       Wt Readings from Last 6 Encounters:   06/11/25 100.2 kg (220 lb 14.4 oz)   06/06/25 101.6 kg (224 lb)   04/04/25 101.6 kg (224 lb)   03/12/25 102.8 kg (226 lb 11.2 oz)   02/04/25 102.2 kg (225 lb 4.8 oz)   01/07/25 102.4 kg (225 lb 11.2 oz)       Immunizations:   Most Recent Immunizations   Administered Date(s) Administered    COVID-19, PFIZER Bivalent, DO NOT Dilute, (age 12y+), IM, 30 mcg/0.3 mL 10/31/2022    COVID-19, PFIZER GRAY top, DO NOT Dilute, (age 12 y+), IM, 30 mcg/0.3 mL 05/22/2022    COVID-19, PFIZER PURPLE top, DILUTE for use, (age 12 y+), 30mcg/0.3mL 12/01/2021    COVID-19, PFIZER, 2024/25, (age 12y+), IM, 30mcg/0.3mL 11/05/2024       Thorough Medication Assessment      Current medications:  Outpatient Medications Marked as Taking for the 6/11/25 encounter (Pharmacy Visit) with [facility]   Medication Sig Dispense Refill    metroNIDAZOLE (FLAGYL) 500 MG tablet Take 1 tablet by mouth in the morning and at bedtime for 7 days 14 tablet 0    ciprofloxacin (CIPRO) 500 MG tablet Take 1 tablet by mouth 2 times daily for 7 days 14 tablet 0    metoprolol succinate (TOPROL XL) 25 MG extended release tablet Take 0.5 tablets by mouth daily 45 tablet 1    aspirin 81 MG chewable tablet Take 1 tablet by mouth daily 90 tablet 3    spironolactone (ALDACTONE) 25 MG tablet Take 1